# Patient Record
Sex: FEMALE | Race: WHITE | NOT HISPANIC OR LATINO
[De-identification: names, ages, dates, MRNs, and addresses within clinical notes are randomized per-mention and may not be internally consistent; named-entity substitution may affect disease eponyms.]

---

## 2021-04-08 ENCOUNTER — APPOINTMENT (OUTPATIENT)
Dept: INTERNAL MEDICINE | Facility: CLINIC | Age: 66
End: 2021-04-08
Payer: COMMERCIAL

## 2021-04-08 ENCOUNTER — LABORATORY RESULT (OUTPATIENT)
Age: 66
End: 2021-04-08

## 2021-04-08 ENCOUNTER — NON-APPOINTMENT (OUTPATIENT)
Age: 66
End: 2021-04-08

## 2021-04-08 VITALS
SYSTOLIC BLOOD PRESSURE: 149 MMHG | RESPIRATION RATE: 12 BRPM | WEIGHT: 293 LBS | HEIGHT: 62 IN | HEART RATE: 100 BPM | DIASTOLIC BLOOD PRESSURE: 91 MMHG | BODY MASS INDEX: 53.92 KG/M2 | OXYGEN SATURATION: 97 %

## 2021-04-08 DIAGNOSIS — J30.9 ALLERGIC RHINITIS, UNSPECIFIED: ICD-10-CM

## 2021-04-08 DIAGNOSIS — Z78.9 OTHER SPECIFIED HEALTH STATUS: ICD-10-CM

## 2021-04-08 DIAGNOSIS — U07.1 COVID-19: ICD-10-CM

## 2021-04-08 DIAGNOSIS — Z83.3 FAMILY HISTORY OF DIABETES MELLITUS: ICD-10-CM

## 2021-04-08 DIAGNOSIS — Z82.69 FAMILY HISTORY OF OTHER DISEASES OF THE MUSCULOSKELETAL SYSTEM AND CONNECTIVE TISSUE: ICD-10-CM

## 2021-04-08 PROCEDURE — 99205 OFFICE O/P NEW HI 60 MIN: CPT | Mod: 25

## 2021-04-08 PROCEDURE — 99417 PROLNG OP E/M EACH 15 MIN: CPT | Mod: 25

## 2021-04-08 PROCEDURE — 36415 COLL VENOUS BLD VENIPUNCTURE: CPT

## 2021-04-08 PROCEDURE — 99072 ADDL SUPL MATRL&STAF TM PHE: CPT

## 2021-04-09 VITALS — DIASTOLIC BLOOD PRESSURE: 88 MMHG | SYSTOLIC BLOOD PRESSURE: 146 MMHG

## 2021-04-09 PROBLEM — Z78.9 NON-SMOKER: Status: ACTIVE | Noted: 2021-04-09

## 2021-04-09 PROBLEM — Z83.3 FAMILY HISTORY OF DIABETES MELLITUS: Status: ACTIVE | Noted: 2021-04-09

## 2021-04-09 PROBLEM — U07.1 COVID-19: Status: RESOLVED | Noted: 2021-04-09 | Resolved: 2021-04-09

## 2021-04-09 PROBLEM — Z82.69 FAMILY HISTORY OF POLYMYALGIA RHEUMATICA: Status: ACTIVE | Noted: 2021-04-09

## 2021-04-09 RX ORDER — BUDESONIDE AND FORMOTEROL FUMARATE DIHYDRATE 160; 4.5 UG/1; UG/1
AEROSOL RESPIRATORY (INHALATION)
Refills: 0 | Status: ACTIVE | COMMUNITY

## 2021-04-09 RX ORDER — FLUTICASONE PROPIONATE 50 MCG
50 SPRAY, SUSPENSION NASAL
Refills: 0 | Status: ACTIVE | COMMUNITY

## 2021-04-09 NOTE — PHYSICAL EXAM
[No Acute Distress] : no acute distress [Well-Appearing] : well-appearing [Normal Sclera/Conjunctiva] : normal sclera/conjunctiva [No Respiratory Distress] : no respiratory distress  [No Accessory Muscle Use] : no accessory muscle use [Clear to Auscultation] : lungs were clear to auscultation bilaterally [Regular Rhythm] : with a regular rhythm [Normal S1, S2] : normal S1 and S2 [No Murmur] : no murmur heard [No Carotid Bruits] : no carotid bruits [de-identified] : Tachycardic [de-identified] : + edema [de-identified] : + calcinosis, ulceration on fingers

## 2021-04-09 NOTE — HISTORY OF PRESENT ILLNESS
[FreeTextEntry8] : Gabby presents today as new patient.\par \par She has complex medical history which includes scleroderma/CREST, HTN, T2DM, and hx of CAH on chronic prednisone. She was previously under care of Rheumatologist who also served as her PCP but who is no longer working. She has been in between providers since that time. She has the following concerns:\par \par 1. Scleroderma\par New Rheum: \par Considering HCQ. Has current symptomatic disease.\par \par 2. T2DM\par On Tradjenta. Did not tolerate metformin. Last A1c was 7.8% but labs from Dec.\par \par 3. Allergic rhinitis\par Constant despite claritin, flonase, and montelukast.\par \par 4. Abnormal CT chest\par B/l lung bases with mild fibrotic changes. + coronary calcification, adrenal nodules, and hepatic leison.\par

## 2021-04-09 NOTE — REVIEW OF SYSTEMS
[Fever] : no fever [Chills] : no chills [Fatigue] : no fatigue [Night Sweats] : no night sweats [Nasal Discharge] : nasal discharge [Chest Pain] : no chest pain [Palpitations] : no palpitations [Shortness Of Breath] : shortness of breath [Abdominal Pain] : no abdominal pain [Nausea] : no nausea [Constipation] : no constipation [Diarrhea] : diarrhea [Vomiting] : no vomiting [Hematuria] : no hematuria [Joint Pain] : joint pain

## 2021-04-09 NOTE — ASSESSMENT
[FreeTextEntry1] : 1. Scleroderma/CREST\par Re-establishing with Rheum- Dr. Spicer\par Active disease with likely contribution to other symptoms\par \par 2. HTN\par Above goal today\par Recheck next week\par CMP today\par \par 3. HLD\par Did not start statin with prior MD\par Recheck lipids\par \par 4. T2DM\par Recheck A1c\par Could consider switch to GLP1 given desire to lose weight\par \par 5. CAH, hypothyroidism\par Needs Endo referral\par Has not had follow up for CAH, but has been on long term prednisone\par Will need DEXA and re-brayan\par TFTs\par \par 6. Adrenal adenoma\par Endo follow up\par Consider hormonal workup next visit though will be hard to test for subclinical Cushing's while on steroids and ?CAH diagnosis\par \par 7. Abonrmal CT chest, ?mild fibrosis at bilateral bases\par ?scleroderma vs 2ndary to COVID19\par Would also have to consider PJP after getting more detailed hx of steroid dosing and how frequently she had higher doses\par Pulm referral\par \par 8. Coronary calcification\par Dyspnea could be 2/2 to scleroderma\par Cards referral\par \par 9. Liver lesion\par Will need MRI abdomen\par Will likely need sedated MRI\par \par 10. Chronic sinusitis\par Continue current\par Check ANCAs\par \par Additional time spent given complexity of new patient visit, interpreting prior lab results, interpreting and revieiwing prior CT results. Additional time coordinating multiple specialty referrals.

## 2021-04-16 ENCOUNTER — APPOINTMENT (OUTPATIENT)
Dept: INTERNAL MEDICINE | Facility: CLINIC | Age: 66
End: 2021-04-16
Payer: COMMERCIAL

## 2021-04-16 VITALS
HEART RATE: 109 BPM | WEIGHT: 293 LBS | SYSTOLIC BLOOD PRESSURE: 170 MMHG | HEIGHT: 62 IN | RESPIRATION RATE: 12 BRPM | DIASTOLIC BLOOD PRESSURE: 81 MMHG | BODY MASS INDEX: 53.92 KG/M2 | OXYGEN SATURATION: 98 %

## 2021-04-16 LAB
ALBUMIN SERPL ELPH-MCNC: 3.9 G/DL
ALP BLD-CCNC: 105 U/L
ALT SERPL-CCNC: 12 U/L
ANION GAP SERPL CALC-SCNC: 11 MMOL/L
AST SERPL-CCNC: 13 U/L
BASOPHILS # BLD AUTO: 0.11 K/UL
BASOPHILS NFR BLD AUTO: 0.9 %
BILIRUB SERPL-MCNC: 0.5 MG/DL
BUN SERPL-MCNC: 10 MG/DL
CALCIUM SERPL-MCNC: 9.7 MG/DL
CHLORIDE SERPL-SCNC: 103 MMOL/L
CHOLEST SERPL-MCNC: 206 MG/DL
CO2 SERPL-SCNC: 24 MMOL/L
CREAT SERPL-MCNC: 0.76 MG/DL
EOSINOPHIL # BLD AUTO: 0.11 K/UL
EOSINOPHIL NFR BLD AUTO: 0.9 %
ESTIMATED AVERAGE GLUCOSE: 220 MG/DL
GLUCOSE SERPL-MCNC: 187 MG/DL
HBA1C MFR BLD HPLC: 9.3 %
HCT VFR BLD CALC: 43.5 %
HDLC SERPL-MCNC: 43 MG/DL
HGB BLD-MCNC: 12.7 G/DL
IMM GRANULOCYTES NFR BLD AUTO: 1.7 %
LDLC SERPL CALC-MCNC: 129 MG/DL
LYMPHOCYTES # BLD AUTO: 1.44 K/UL
LYMPHOCYTES NFR BLD AUTO: 12.4 %
MAN DIFF?: NORMAL
MCHC RBC-ENTMCNC: 23.2 PG
MCHC RBC-ENTMCNC: 29.2 GM/DL
MCV RBC AUTO: 79.5 FL
MONOCYTES # BLD AUTO: 0.92 K/UL
MONOCYTES NFR BLD AUTO: 7.9 %
MPO AB + PR3 PNL SER: NORMAL
NEUTROPHILS # BLD AUTO: 8.85 K/UL
NEUTROPHILS NFR BLD AUTO: 76.2 %
NONHDLC SERPL-MCNC: 163 MG/DL
PLATELET # BLD AUTO: 450 K/UL
POTASSIUM SERPL-SCNC: 4.6 MMOL/L
PROT SERPL-MCNC: 6.6 G/DL
RBC # BLD: 5.47 M/UL
RBC # FLD: 18.5 %
SODIUM SERPL-SCNC: 139 MMOL/L
TRIGL SERPL-MCNC: 171 MG/DL
TSH SERPL-ACNC: 0.8 UIU/ML
WBC # FLD AUTO: 11.63 K/UL

## 2021-04-16 PROCEDURE — 99215 OFFICE O/P EST HI 40 MIN: CPT | Mod: 25

## 2021-04-16 PROCEDURE — 99072 ADDL SUPL MATRL&STAF TM PHE: CPT

## 2021-04-16 PROCEDURE — 36415 COLL VENOUS BLD VENIPUNCTURE: CPT

## 2021-04-17 VITALS — SYSTOLIC BLOOD PRESSURE: 126 MMHG | DIASTOLIC BLOOD PRESSURE: 78 MMHG

## 2021-04-17 NOTE — REVIEW OF SYSTEMS
[Fever] : no fever [Chills] : no chills [Chest Pain] : no chest pain [Shortness Of Breath] : shortness of breath [Abdominal Pain] : no abdominal pain [Nausea] : no nausea [Constipation] : no constipation [Diarrhea] : diarrhea [Vomiting] : no vomiting [Joint Pain] : joint pain

## 2021-04-17 NOTE — PHYSICAL EXAM
[No Acute Distress] : no acute distress [Well Nourished] : well nourished [Well Developed] : well developed [No Respiratory Distress] : no respiratory distress  [No Accessory Muscle Use] : no accessory muscle use [Clear to Auscultation] : lungs were clear to auscultation bilaterally [Regular Rhythm] : with a regular rhythm [Normal S1, S2] : normal S1 and S2 [Alert and Oriented x3] : oriented to person, place, and time [de-identified] : tachycardic

## 2021-04-17 NOTE — HISTORY OF PRESENT ILLNESS
[de-identified] : Gabby returns for follow up.\par \par In interim, she saw Dr. Spicer again and had labs with elevated inflammatory marker and +TPO abs which goes along with her hypothyroidism diagnosis. During last visit, she was re-referred to Pulm (interested in transitioning care), Endo (for both confirmation of CAH dx, workup of adrenal nodukes, also with T2DM and hypothyroidism), and Cards (coronary calcifications, DALTON). \par \par Labs were reviewed. Notable for microcytosis, A1c above goal, HLD. \par \par With regards to T2DM, she does not tolerate metformin. She was previously Jardiance but taken off. She also had prolonged period of UTI. She was given repaglinide from her last PCP but never took it. She does not want to use injectables.\par \par With regards to liver lesion seen on CT, no abdominal pain or symptoms. She needs sedated MRI.\par \par She also has HLD. She was told to take statin in past but never started on it.\par \par Rheum has started her on prazosin for Raynaud's since she didn't tolerate procardia in past. [FreeTextEntry1] : f/u T2DM, HTN, HLD, liver lesion

## 2021-04-17 NOTE — ASSESSMENT
[FreeTextEntry1] : 1. T2DM\par Increase glimepiride to 3mg qAM and 2mg qPM (from 2mg BID)\par She had been forgetting PM dose so will increase gradually and try to increase adherence\par Will monitor raffi SNIDER (has supplies at home)\par Encouraged Endo follow up\par \par 2. HTN\par Improved on recheck\par Continue ACE-I at current dose\par \par 3. Thyroid disease\par Concern for JACK from Rheum\par Add on T4, T3, TSI, receptor abs\par Seeing Eye doctor\par \par 4. Microcytosis\par Check iron panel\par \par 5.HLD\par Start rosuvastatin 5mg qd\par \par 6. Liver lesion\par Rx for MRI abdomen with sedation\par \par 7. CREST\par Following with rheum\par Planning to start Plaquenil potentially\par \par 8. CAH\par Needs Endo follow up\par Encouraged to make appt\par \par 9. DALTON\par Needs both Card,s Pulm\par Encouraged to make appts\par

## 2021-04-20 DIAGNOSIS — R71.8 OTHER ABNORMALITY OF RED BLOOD CELLS: ICD-10-CM

## 2021-04-20 LAB
APPEARANCE: CLEAR
BACTERIA: ABNORMAL
BILIRUBIN URINE: NEGATIVE
BLOOD URINE: NEGATIVE
COLOR: NORMAL
CREAT SPEC-SCNC: 57 MG/DL
FERRITIN SERPL-MCNC: 30 NG/ML
FOLATE SERPL-MCNC: 7.9 NG/ML
GLUCOSE QUALITATIVE U: NEGATIVE
HYALINE CASTS: 0 /LPF
IRON SATN MFR SERPL: 7 %
IRON SERPL-MCNC: 27 UG/DL
KETONES URINE: NEGATIVE
LEUKOCYTE ESTERASE URINE: ABNORMAL
MICROALBUMIN 24H UR DL<=1MG/L-MCNC: <1.2 MG/DL
MICROALBUMIN/CREAT 24H UR-RTO: NORMAL MG/G
MICROSCOPIC-UA: NORMAL
NITRITE URINE: NEGATIVE
PH URINE: 5.5
PROTEIN URINE: NEGATIVE
RED BLOOD CELLS URINE: 2 /HPF
SPECIFIC GRAVITY URINE: 1.01
SQUAMOUS EPITHELIAL CELLS: 2 /HPF
T3FREE SERPL-MCNC: 3.87 PG/ML
T4 FREE SERPL-MCNC: 1.4 NG/DL
THYROGLOB AB SERPL-ACNC: 1281 IU/ML
THYROPEROXIDASE AB SERPL IA-ACNC: 82 IU/ML
TIBC SERPL-MCNC: 374 UG/DL
TSH RECEPTOR AB: <1.1 IU/L
UIBC SERPL-MCNC: 347 UG/DL
UROBILINOGEN URINE: NORMAL
VIT B12 SERPL-MCNC: 305 PG/ML
WHITE BLOOD CELLS URINE: 8 /HPF

## 2021-04-22 LAB — TSI ACT/NOR SER: <0.1 IU/L

## 2021-05-06 ENCOUNTER — APPOINTMENT (OUTPATIENT)
Dept: INTERNAL MEDICINE | Facility: CLINIC | Age: 66
End: 2021-05-06

## 2021-05-25 ENCOUNTER — NON-APPOINTMENT (OUTPATIENT)
Age: 66
End: 2021-05-25

## 2021-06-08 ENCOUNTER — APPOINTMENT (OUTPATIENT)
Dept: INTERNAL MEDICINE | Facility: CLINIC | Age: 66
End: 2021-06-08
Payer: COMMERCIAL

## 2021-06-08 VITALS
DIASTOLIC BLOOD PRESSURE: 84 MMHG | WEIGHT: 293 LBS | BODY MASS INDEX: 62.19 KG/M2 | SYSTOLIC BLOOD PRESSURE: 132 MMHG | TEMPERATURE: 98.1 F | HEART RATE: 108 BPM | OXYGEN SATURATION: 89 %

## 2021-06-08 DIAGNOSIS — H93.19 TINNITUS, UNSPECIFIED EAR: ICD-10-CM

## 2021-06-08 PROCEDURE — 99072 ADDL SUPL MATRL&STAF TM PHE: CPT

## 2021-06-08 PROCEDURE — 36415 COLL VENOUS BLD VENIPUNCTURE: CPT

## 2021-06-08 PROCEDURE — 99215 OFFICE O/P EST HI 40 MIN: CPT | Mod: 25

## 2021-06-08 NOTE — PHYSICAL EXAM
[No Acute Distress] : no acute distress [Well Nourished] : well nourished [Well Developed] : well developed [Well-Appearing] : well-appearing [Normal Sclera/Conjunctiva] : normal sclera/conjunctiva [Normal Outer Ear/Nose] : the outer ears and nose were normal in appearance [Normal TMs] : both tympanic membranes were normal [No Respiratory Distress] : no respiratory distress  [No Accessory Muscle Use] : no accessory muscle use [Clear to Auscultation] : lungs were clear to auscultation bilaterally [Normal Rate] : normal rate  [Regular Rhythm] : with a regular rhythm [Normal S1, S2] : normal S1 and S2 [No Edema] : there was no peripheral edema

## 2021-06-08 NOTE — ASSESSMENT
[FreeTextEntry1] : 1. Tinnitus\par Audiometry normal\par Refer to ENT\par \par 2. T2DM\par On glimepiride 3mg BID, Tradjenta\par Recheck labs\par Will likely add Farxiga\par Would consider swithcing Tradjenta to GLP1 for better glycemic control and to help with wt loss\par \par 3. Obesity\par Refer to med wt management\par \par 4. HLD\par Due for lipids\par \par 5. Iron def\par Did not start iron\par Recheck labs\par

## 2021-06-08 NOTE — HISTORY OF PRESENT ILLNESS
[FreeTextEntry1] : diabetes, tinnitus, HLD [de-identified] : Gabby returns for follow up.\par \par 1. T2DM\par Average blood glucose 190-200. On Tradjenta and glimepiride 3mg BID. Believes Farxiga will be covered by her insurance. She still wishes to avoid injectables. She has not seen Endo yet.\par \par 2. HLD\par Did not start statin\par \par 3. Tinnitus\par Ringing in ears, not pulsatile. Hearing normal. No other URI/ear symptoms.\par

## 2021-06-15 LAB
25(OH)D3 SERPL-MCNC: 32.1 NG/ML
ALBUMIN SERPL ELPH-MCNC: 3.7 G/DL
ALP BLD-CCNC: 97 U/L
ALT SERPL-CCNC: 12 U/L
ANION GAP SERPL CALC-SCNC: 16 MMOL/L
AST SERPL-CCNC: 14 U/L
BASOPHILS # BLD AUTO: 0.14 K/UL
BASOPHILS NFR BLD AUTO: 1.1 %
BILIRUB SERPL-MCNC: 0.3 MG/DL
BUN SERPL-MCNC: 13 MG/DL
CALCIUM SERPL-MCNC: 9.6 MG/DL
CHLORIDE SERPL-SCNC: 101 MMOL/L
CHOLEST SERPL-MCNC: 194 MG/DL
CO2 SERPL-SCNC: 20 MMOL/L
CREAT SERPL-MCNC: 0.79 MG/DL
EOSINOPHIL # BLD AUTO: 0.11 K/UL
EOSINOPHIL NFR BLD AUTO: 0.8 %
ERYTHROCYTE [SEDIMENTATION RATE] IN BLOOD BY WESTERGREN METHOD: 56 MM/HR
FERRITIN SERPL-MCNC: 43 NG/ML
GLUCOSE SERPL-MCNC: 210 MG/DL
HCT VFR BLD CALC: 44.9 %
HDLC SERPL-MCNC: 41 MG/DL
HGB BLD-MCNC: 13.1 G/DL
IMM GRANULOCYTES NFR BLD AUTO: 2 %
IRON SATN MFR SERPL: 10 %
IRON SERPL-MCNC: 37 UG/DL
LDLC SERPL CALC-MCNC: 120 MG/DL
LYMPHOCYTES # BLD AUTO: 1.59 K/UL
LYMPHOCYTES NFR BLD AUTO: 12.3 %
MAN DIFF?: NORMAL
MCHC RBC-ENTMCNC: 23.9 PG
MCHC RBC-ENTMCNC: 29.2 GM/DL
MCV RBC AUTO: 81.9 FL
MONOCYTES # BLD AUTO: 1.06 K/UL
MONOCYTES NFR BLD AUTO: 8.2 %
NEUTROPHILS # BLD AUTO: 9.8 K/UL
NEUTROPHILS NFR BLD AUTO: 75.6 %
NONHDLC SERPL-MCNC: 152 MG/DL
PLATELET # BLD AUTO: 447 K/UL
POTASSIUM SERPL-SCNC: 4.7 MMOL/L
PROT SERPL-MCNC: 6.8 G/DL
RBC # BLD: 5.48 M/UL
RBC # FLD: 18.7 %
SODIUM SERPL-SCNC: 136 MMOL/L
TIBC SERPL-MCNC: 375 UG/DL
TRIGL SERPL-MCNC: 163 MG/DL
UIBC SERPL-MCNC: 339 UG/DL
WBC # FLD AUTO: 12.96 K/UL

## 2021-07-20 ENCOUNTER — APPOINTMENT (OUTPATIENT)
Dept: INTERNAL MEDICINE | Facility: CLINIC | Age: 66
End: 2021-07-20
Payer: COMMERCIAL

## 2021-07-20 VITALS
TEMPERATURE: 98.2 F | SYSTOLIC BLOOD PRESSURE: 125 MMHG | DIASTOLIC BLOOD PRESSURE: 67 MMHG | HEART RATE: 115 BPM | BODY MASS INDEX: 62.19 KG/M2 | WEIGHT: 293 LBS | OXYGEN SATURATION: 88 %

## 2021-07-20 LAB
BILIRUB UR QL STRIP: NORMAL
GLUCOSE UR-MCNC: ABNORMAL
HCG UR QL: NORMAL EU/DL
HGB UR QL STRIP.AUTO: ABNORMAL
KETONES UR-MCNC: NORMAL
LEUKOCYTE ESTERASE UR QL STRIP: ABNORMAL
NITRITE UR QL STRIP: NORMAL
PH UR STRIP: 5
PROT UR STRIP-MCNC: ABNORMAL
SP GR UR STRIP: 1.02

## 2021-07-20 PROCEDURE — 81003 URINALYSIS AUTO W/O SCOPE: CPT | Mod: QW

## 2021-07-20 PROCEDURE — 36415 COLL VENOUS BLD VENIPUNCTURE: CPT

## 2021-07-20 PROCEDURE — 99072 ADDL SUPL MATRL&STAF TM PHE: CPT

## 2021-07-20 PROCEDURE — 99215 OFFICE O/P EST HI 40 MIN: CPT | Mod: 25

## 2021-07-20 RX ORDER — DAPAGLIFLOZIN 5 MG/1
5 TABLET, FILM COATED ORAL
Qty: 90 | Refills: 0 | Status: DISCONTINUED | COMMUNITY
Start: 2021-06-15 | End: 2021-07-20

## 2021-07-21 RX ORDER — CIPROFLOXACIN HYDROCHLORIDE 250 MG/1
250 TABLET, FILM COATED ORAL
Qty: 6 | Refills: 0 | Status: DISCONTINUED | COMMUNITY
Start: 2021-07-20 | End: 2021-07-21

## 2021-07-21 NOTE — HISTORY OF PRESENT ILLNESS
[FreeTextEntry8] : Gabby presents with concern for UTI.\par \par She developed lower abdominal discomfort, itching, and dysuria several days ago. It has not improved. She stopped Farxiga at start of symptoms. No f/c, n/v. or back/flank pain. She otherwsie feels at baseline. She is also worried about her glycemic control and wants second opinion on her scleroderma plan of care. She had previously been referred to and given recommendations for Endo and Rheum but has not set anything up yet.

## 2021-07-21 NOTE — PHYSICAL EXAM
[No Acute Distress] : no acute distress [Well Nourished] : well nourished [Well Developed] : well developed [Well-Appearing] : well-appearing [No Respiratory Distress] : no respiratory distress  [No Accessory Muscle Use] : no accessory muscle use [Clear to Auscultation] : lungs were clear to auscultation bilaterally [Regular Rhythm] : with a regular rhythm [Normal S1, S2] : normal S1 and S2 [Soft] : abdomen soft [Non Tender] : non-tender [Non-distended] : non-distended [Normal Bowel Sounds] : normal bowel sounds [No CVA Tenderness] : no CVA  tenderness [No Spinal Tenderness] : no spinal tenderness [Alert and Oriented x3] : oriented to person, place, and time [de-identified] : +tachycardic

## 2021-07-21 NOTE — REVIEW OF SYSTEMS
[Dysuria] : dysuria [Frequency] : frequency [Fever] : no fever [Chills] : no chills [Night Sweats] : no night sweats [Abdominal Pain] : no abdominal pain [Nausea] : no nausea [Vomiting] : no vomiting [Incontinence] : no incontinence [Hematuria] : no hematuria

## 2021-07-21 NOTE — ASSESSMENT
[FreeTextEntry1] : 1. UTI, +/- candidiasis \par Stop farxiga. Cannot tolerate SGLT2s (this was retrial)\par POCT UA looks like UTI and itching raising concern for co-existing candidiasis\par Treat with cipro and diflucan\par Send UA, culture\par \par 2. T2DM\par Cannot tolerate metformin, SGLT2s\par Continue Jardiance and glimepiride\par Has been referred to Endo in past but has not gone\par Encouraged to make follow up\par \par 3. RAMYA\par Due for iron studies and CBC\par \par 4. Scleroderma\par Poor symptom control\par Re-referred to second opinions for Rheum \par \par ADDENDUM: AFRAID TO START CIPRO SINCE MOM WAS ALLERGIC. PREFERS TO USE MACROBID. WILL RX MACROBID AS NO SIGNS OF COMPLICATED CYSTIITS/PYELO. WILL CALL IF DEVELOPS F/C, N/V/D, OR NOT IMPROVING.

## 2021-07-28 LAB
ALBUMIN SERPL ELPH-MCNC: 3.7 G/DL
ALP BLD-CCNC: 90 U/L
ALT SERPL-CCNC: 17 U/L
ANION GAP SERPL CALC-SCNC: 12 MMOL/L
APPEARANCE: ABNORMAL
AST SERPL-CCNC: 16 U/L
BACTERIA UR CULT: NORMAL
BACTERIA: ABNORMAL
BASOPHILS # BLD AUTO: 0.12 K/UL
BASOPHILS NFR BLD AUTO: 1 %
BILIRUB SERPL-MCNC: 0.4 MG/DL
BILIRUBIN URINE: NEGATIVE
BLOOD URINE: ABNORMAL
BUN SERPL-MCNC: 12 MG/DL
CALCIUM SERPL-MCNC: 9.2 MG/DL
CHLORIDE SERPL-SCNC: 105 MMOL/L
CHOLEST SERPL-MCNC: 187 MG/DL
CO2 SERPL-SCNC: 22 MMOL/L
COLOR: YELLOW
CREAT SERPL-MCNC: 0.77 MG/DL
EOSINOPHIL # BLD AUTO: 0.14 K/UL
EOSINOPHIL NFR BLD AUTO: 1.2 %
ESTIMATED AVERAGE GLUCOSE: 197 MG/DL
FERRITIN SERPL-MCNC: 25 NG/ML
GLUCOSE QUALITATIVE U: ABNORMAL
GLUCOSE SERPL-MCNC: 200 MG/DL
HBA1C MFR BLD HPLC: 8.5 %
HCT VFR BLD CALC: 45 %
HDLC SERPL-MCNC: 40 MG/DL
HGB BLD-MCNC: 13 G/DL
HYALINE CASTS: 2 /LPF
IMM GRANULOCYTES NFR BLD AUTO: 1.7 %
IRON SATN MFR SERPL: 6 %
IRON SERPL-MCNC: 24 UG/DL
KETONES URINE: NEGATIVE
LDLC SERPL CALC-MCNC: 117 MG/DL
LEUKOCYTE ESTERASE URINE: ABNORMAL
LYMPHOCYTES # BLD AUTO: 1.71 K/UL
LYMPHOCYTES NFR BLD AUTO: 14.2 %
MAN DIFF?: NORMAL
MCHC RBC-ENTMCNC: 23.6 PG
MCHC RBC-ENTMCNC: 28.9 GM/DL
MCV RBC AUTO: 81.8 FL
MICROSCOPIC-UA: NORMAL
MONOCYTES # BLD AUTO: 1.1 K/UL
MONOCYTES NFR BLD AUTO: 9.1 %
NEUTROPHILS # BLD AUTO: 8.79 K/UL
NEUTROPHILS NFR BLD AUTO: 72.8 %
NITRITE URINE: NEGATIVE
NONHDLC SERPL-MCNC: 148 MG/DL
PH URINE: 6
PLATELET # BLD AUTO: 466 K/UL
POTASSIUM SERPL-SCNC: 4.2 MMOL/L
PROT SERPL-MCNC: 6.6 G/DL
PROTEIN URINE: ABNORMAL
RBC # BLD: 5.5 M/UL
RBC # FLD: 18.9 %
RED BLOOD CELLS URINE: 19 /HPF
SODIUM SERPL-SCNC: 139 MMOL/L
SPECIFIC GRAVITY URINE: 1.01
SQUAMOUS EPITHELIAL CELLS: 3 /HPF
TIBC SERPL-MCNC: 400 UG/DL
TRIGL SERPL-MCNC: 151 MG/DL
UIBC SERPL-MCNC: 376 UG/DL
UROBILINOGEN URINE: NORMAL
WBC # FLD AUTO: 12.07 K/UL
WHITE BLOOD CELLS URINE: >720 /HPF

## 2021-08-13 ENCOUNTER — RESULT CHARGE (OUTPATIENT)
Age: 66
End: 2021-08-13

## 2021-08-13 ENCOUNTER — APPOINTMENT (OUTPATIENT)
Dept: INTERNAL MEDICINE | Facility: CLINIC | Age: 66
End: 2021-08-13
Payer: COMMERCIAL

## 2021-08-13 VITALS
SYSTOLIC BLOOD PRESSURE: 138 MMHG | HEART RATE: 90 BPM | DIASTOLIC BLOOD PRESSURE: 83 MMHG | WEIGHT: 293 LBS | OXYGEN SATURATION: 93 % | BODY MASS INDEX: 62.19 KG/M2

## 2021-08-13 DIAGNOSIS — B37.3 CANDIDIASIS OF VULVA AND VAGINA: ICD-10-CM

## 2021-08-13 LAB
BILIRUB UR QL STRIP: NORMAL
GLUCOSE UR-MCNC: NORMAL
HCG UR QL: NORMAL EU/DL
HGB UR QL STRIP.AUTO: ABNORMAL
KETONES UR-MCNC: NORMAL
NITRITE UR QL STRIP: NORMAL
PH UR STRIP: 5.5
PROT UR STRIP-MCNC: NORMAL
SP GR UR STRIP: 1.01

## 2021-08-13 PROCEDURE — 81003 URINALYSIS AUTO W/O SCOPE: CPT | Mod: QW

## 2021-08-13 PROCEDURE — 99213 OFFICE O/P EST LOW 20 MIN: CPT | Mod: 25

## 2021-08-13 RX ORDER — NITROFURANTOIN (MONOHYDRATE/MACROCRYSTALS) 25; 75 MG/1; MG/1
100 CAPSULE ORAL TWICE DAILY
Qty: 10 | Refills: 0 | Status: DISCONTINUED | COMMUNITY
Start: 2021-07-21 | End: 2021-08-13

## 2021-08-17 PROBLEM — B37.3 VAGINAL CANDIDIASIS: Status: ACTIVE | Noted: 2021-07-20

## 2021-08-17 NOTE — REVIEW OF SYSTEMS
[Fever] : no fever [Chills] : no chills [Fatigue] : no fatigue [Night Sweats] : no night sweats [Chest Pain] : no chest pain [Palpitations] : no palpitations [Shortness Of Breath] : no shortness of breath [Abdominal Pain] : no abdominal pain [Nausea] : no nausea [Constipation] : no constipation [Diarrhea] : diarrhea [Vomiting] : no vomiting [Dysuria] : no dysuria [Hematuria] : no hematuria [Headache] : no headache [Dizziness] : no dizziness

## 2021-08-17 NOTE — PHYSICAL EXAM
[No Acute Distress] : no acute distress [Well Nourished] : well nourished [Well-Appearing] : well-appearing [No Respiratory Distress] : no respiratory distress  [No Accessory Muscle Use] : no accessory muscle use [Clear to Auscultation] : lungs were clear to auscultation bilaterally [Regular Rhythm] : with a regular rhythm [Normal S1, S2] : normal S1 and S2 [Soft] : abdomen soft [Non Tender] : non-tender [No CVA Tenderness] : no CVA  tenderness

## 2021-08-17 NOTE — ASSESSMENT
[FreeTextEntry1] : 1. UTI\par POCT UA improved\par Send for UA and culture\par no further treatment at this time

## 2021-08-17 NOTE — HISTORY OF PRESENT ILLNESS
[FreeTextEntry1] : f/u UTI [de-identified] : Gabby returns for follow up.\par \par She completed treatment for UTI and co-treatment for candidiasis. She is feeling improved. No recurrent symptoms. No f/c, abdominal or back pain,hematuria, or dysuria.

## 2021-08-19 LAB
APPEARANCE: ABNORMAL
BACTERIA UR CULT: NORMAL
BACTERIA: NEGATIVE
BILIRUBIN URINE: NEGATIVE
BLOOD URINE: NEGATIVE
COLOR: YELLOW
GLUCOSE QUALITATIVE U: NEGATIVE
HYALINE CASTS: 0 /LPF
KETONES URINE: NEGATIVE
LEUKOCYTE ESTERASE URINE: ABNORMAL
MICROSCOPIC-UA: NORMAL
NITRITE URINE: NEGATIVE
PH URINE: 6
PROTEIN URINE: NORMAL
RED BLOOD CELLS URINE: 3 /HPF
SPECIFIC GRAVITY URINE: 1.01
SQUAMOUS EPITHELIAL CELLS: 8 /HPF
URINE COMMENTS: NORMAL
UROBILINOGEN URINE: NORMAL
WHITE BLOOD CELLS URINE: 165 /HPF

## 2021-09-15 ENCOUNTER — APPOINTMENT (OUTPATIENT)
Dept: ENDOCRINOLOGY | Facility: CLINIC | Age: 66
End: 2021-09-15
Payer: COMMERCIAL

## 2021-09-15 VITALS
DIASTOLIC BLOOD PRESSURE: 89 MMHG | HEIGHT: 62 IN | OXYGEN SATURATION: 99 % | BODY MASS INDEX: 53.92 KG/M2 | SYSTOLIC BLOOD PRESSURE: 138 MMHG | HEART RATE: 89 BPM | WEIGHT: 293 LBS

## 2021-09-15 DIAGNOSIS — D35.00 BENIGN NEOPLASM OF UNSPECIFIED ADRENAL GLAND: ICD-10-CM

## 2021-09-15 PROCEDURE — 99205 OFFICE O/P NEW HI 60 MIN: CPT | Mod: 25

## 2021-09-15 PROCEDURE — 36415 COLL VENOUS BLD VENIPUNCTURE: CPT

## 2021-09-19 PROBLEM — D35.00 ADRENAL ADENOMA: Status: ACTIVE | Noted: 2021-04-09

## 2021-09-19 NOTE — CONSULT LETTER
[Dear  ___] : Dear  [unfilled], [Consult Letter:] : I had the pleasure of evaluating your patient, [unfilled]. [Please see my note below.] : Please see my note below. [Consult Closing:] : Thank you very much for allowing me to participate in the care of this patient.  If you have any questions, please do not hesitate to contact me. [Sincerely,] : Sincerely, [FreeTextEntry3] : Basia Granados MS. DO.\par Endocrinology, Diabetes and Metabolism\par [address] [place] [address]\[place] [address]\par Tel [phone]\par Fax [phone]\par

## 2021-09-19 NOTE — HISTORY OF PRESENT ILLNESS
[FreeTextEntry1] : Ms. ASHLEY SANDOVAL  is a 65 year old female with past medical history of Diabetes Mellitus Type 2, CAH on prednisone, Scleroderma, Hashimotos Hypothyroidism, Adrenal Nodules, Wheelchair bound with cane for short distances with ankle issues who presents for management. \par \par Re Diabetes\par She has had difficulty in controlling her  blood sugars. Patient denies any history of diabetic retinopathy, nephropathy or neuropathy. He denies any blurry vision, polyuria, polydipsia and numbness/tingling in the extremities.\par \par \par POCT Glucose: mg/dL\par POCT Hga1c: %\par \par \par Diabetes first diagnosed:\par Type: 2\par \par Current diabetic regimen:\par Glimepiride 3 mg BID\par Tradjenta 5 mg QD\par Farxiga (yeast infection)\par \par Other relevant medications:\par \par Self monitoring blood glucose : 1x times per day:\par \par SMBG:\par Pre-breakfast:200s\par Pre-lunch:\par Pre-dinner:\par bedtime:\par \par Hypoglycemia:\par \par \par Diet:Tries to maintain\par \par Exercise:\par \par Urine micro: trace prot (8/2021)\par lipid profile: (7/2021)\par last hba1c:8.5% (7/2021)\par eye exam:none\par diabetic foot exam/podiatry:NA\par \par Re CAH\par Patient was diagnosed while being managed by fertility specialists. She was placed on dexamethasone to help with fertility. She had 5 miscarriages. She was able to eventually able to have one child. She was always managed by fertility. She did see an endo 5 years ago but she did not have a formal evaluation for CAH. Her diabetes has been uncontrolled. She has gained a lot of weight. \par \par Re Thyroid\par She was diagnosed with hypothyroidism in her 20s and she has been on thyroid replacement. She takes Levothyroxine 175 mcg QD. \par \par

## 2021-09-19 NOTE — ASSESSMENT
[FreeTextEntry1] : 65 year old female with past medical history of Diabetes Mellitus Type 2, CAH on prednisone, Scleroderma, Hashimotos Hypothyroidism, Adrenal Nodules, Wheelchair bound with cane for short distances with ankle issues who presents for management\par \par 1. DM 2- uncontrolled, uncomplicated\par Patient counseled on the importance of diabetic control and complications. Patient's diet and the necessary changes discussed. Reviewed over freestyle yuliya and use. Reviewed over glycemic goals. Discussed other options for diabetes control. Suggested to try GLP-1. Reviewed mechanism of action, risks and benefits. Patient agrees to try. \par \par Training provided for GLP-1 injection.\par Cont Glimepiride 3 mg BID\par Stop Tradjenta\par Start Trulicity\par Check fsg \par Cont diabetic diet\par Cont exercise\par Blood was drawn in the office today\par will check hga1c\par Opthalmology Visit needs follow up\par Foot Exam up to date\par \par 2. HLD- stable\par Cont Rosuvastatin\par \par 3. Hypothyroidism\par Cont Levothyroxine 175 mcg QD\par \par 4. CAH\par Will check levels\par Cont Prednisone \par May switch to hydrocortisone\par

## 2021-10-04 LAB
17OHP SERPL-MCNC: 15 NG/DL
CHOLEST SERPL-MCNC: 203 MG/DL
DHEA-S SERPL-MCNC: 65.5 UG/DL
ESTIMATED AVERAGE GLUCOSE: 209 MG/DL
HBA1C MFR BLD HPLC: 8.9 %
HDLC SERPL-MCNC: 44 MG/DL
LDLC SERPL CALC-MCNC: 133 MG/DL
NONHDLC SERPL-MCNC: 159 MG/DL
TESTOST BND SERPL-MCNC: 5.4 PG/ML
TESTOSTERONE TOTAL S: 34 NG/DL
TRIGL SERPL-MCNC: 132 MG/DL

## 2021-10-18 ENCOUNTER — NON-APPOINTMENT (OUTPATIENT)
Age: 66
End: 2021-10-18

## 2021-11-30 ENCOUNTER — APPOINTMENT (OUTPATIENT)
Dept: ENDOCRINOLOGY | Facility: CLINIC | Age: 66
End: 2021-11-30
Payer: COMMERCIAL

## 2021-11-30 VITALS — OXYGEN SATURATION: 95 % | SYSTOLIC BLOOD PRESSURE: 137 MMHG | HEART RATE: 74 BPM | DIASTOLIC BLOOD PRESSURE: 84 MMHG

## 2021-11-30 PROCEDURE — 99215 OFFICE O/P EST HI 40 MIN: CPT | Mod: 25

## 2021-11-30 PROCEDURE — 82962 GLUCOSE BLOOD TEST: CPT

## 2021-11-30 PROCEDURE — 83036 HEMOGLOBIN GLYCOSYLATED A1C: CPT | Mod: QW

## 2021-11-30 NOTE — HISTORY OF PRESENT ILLNESS
[FreeTextEntry1] : Ms. ASHLEY SANDOVAL  is a 65 year old female with past medical history of Diabetes Mellitus Type 2, CAH on prednisone, Scleroderma, Hashimoto's Hypothyroidism, Adrenal Nodules, Wheelchair bound with cane for short distances with ankle issues who presents for management. \par \par Re Diabetes\par She has had difficulty in controlling her  blood sugars. Patient denies any history of diabetic retinopathy, nephropathy or neuropathy. He denies any blurry vision, polyuria, polydipsia and numbness/tingling in the extremities.\par \par \par POCT Glucose: 226 mg/dL\par POCT Hga1c: %\par \par \par Diabetes first diagnosed:\par Type: 2\par \par Current diabetic regimen:\par Glimepiride 3 mg BID\par Tradjenta 5 mg QD (stopped)\par Trulicity 0.75 mg Qweekly\par Farxiga (yeast infection)\par \par Other relevant medications:\par \par Self monitoring blood glucose : 1x times per day:\par \par SMBG:\par Pre-breakfast:200s\par Pre-lunch:\par Pre-dinner:\par bedtime:\par \par Hypoglycemia:\par \par \par Diet:Tries to maintain\par \par Exercise:\par \par Urine micro: trace prot (8/2021)\par lipid profile: (7/2021)\par last hba1c:7.9% (11/2021), 8.5% (7/2021)\par eye exam:none\par diabetic foot exam/podiatry:in office 9/2021\par \par Re CAH\par Patient was diagnosed while being managed by fertility specialists. She was placed on dexamethasone to help with fertility. She had 5 miscarriages. She was able to eventually able to have one child. She was always managed by fertility. She did see an endo 5 years ago but she did not have a formal evaluation for CAH. Her diabetes has been uncontrolled. She has gained a lot of weight. Attempt was made to switch from prednisone to hydrocortisone. Patient developed sob.\par \par Re Thyroid\par She was diagnosed with hypothyroidism in her 20s and she has been on thyroid replacement. She takes Levothyroxine 175 mcg QD. \par \par

## 2021-12-13 ENCOUNTER — RX RENEWAL (OUTPATIENT)
Age: 66
End: 2021-12-13

## 2021-12-13 ENCOUNTER — APPOINTMENT (OUTPATIENT)
Dept: PULMONOLOGY | Facility: CLINIC | Age: 66
End: 2021-12-13
Payer: COMMERCIAL

## 2021-12-13 VITALS
HEART RATE: 101 BPM | BODY MASS INDEX: 53.92 KG/M2 | WEIGHT: 293 LBS | OXYGEN SATURATION: 96 % | HEIGHT: 62 IN | SYSTOLIC BLOOD PRESSURE: 148 MMHG | DIASTOLIC BLOOD PRESSURE: 83 MMHG

## 2021-12-13 DIAGNOSIS — Z20.822 ENCOUNTER FOR PREPROCEDURAL LABORATORY EXAMINATION: ICD-10-CM

## 2021-12-13 DIAGNOSIS — Z01.812 ENCOUNTER FOR PREPROCEDURAL LABORATORY EXAMINATION: ICD-10-CM

## 2021-12-13 PROCEDURE — 99204 OFFICE O/P NEW MOD 45 MIN: CPT

## 2021-12-13 RX ORDER — FLUTICASONE FUROATE AND VILANTEROL TRIFENATATE 100; 25 UG/1; UG/1
100-25 POWDER RESPIRATORY (INHALATION)
Qty: 1 | Refills: 2 | Status: ACTIVE | COMMUNITY
Start: 2021-12-13 | End: 1900-01-01

## 2021-12-13 NOTE — ASSESSMENT
[FreeTextEntry1] : zpack/flonase, fu with ENT\par \par repeat ct chest march 2022\par \par fu for PFT and exercise oximetry\par \par trial of breo once daily in the meantime\par \par weight also a factor which she is aware of.

## 2021-12-13 NOTE — HISTORY OF PRESENT ILLNESS
[Former] : former [TextBox_4] : 66F with scleroderma, hashimotos, dm, obesity, congenital adrenal hyperplasia on prednisone\par \par covid infection feb 2020 (not documented)\par covid again nov 2021 (not documented but son was + and lives together)\par \par chronic cough, post nasal drip--on flonase, hasn't seen ent\par worsening dyspnea since feb 2020\par ct chest march 2021 with some ILD\par  [TextBox_13] : 4 [YearQuit] : 1981

## 2021-12-13 NOTE — CONSULT LETTER
[DrCarlene  ___] : Dr. MAHARAJ [FreeTextEntry1] : Dear ,\par \par I had the pleasure of evaluating your patient, ASHLEY SANDOVAL today in pulmonary consultation.  Please refer to my attached note for my findings and recommendations.\par \par \par Thank you for allowing me to participate in the care of your patient, please feel free to call with any questions or concerns.\par \par \par Sincerely,\par \par Vanesa Waldron MD\par [facility] \par [facility]\par \par

## 2022-01-03 ENCOUNTER — RX RENEWAL (OUTPATIENT)
Age: 67
End: 2022-01-03

## 2022-01-06 ENCOUNTER — APPOINTMENT (OUTPATIENT)
Dept: PULMONOLOGY | Facility: CLINIC | Age: 67
End: 2022-01-06

## 2022-01-20 ENCOUNTER — RX RENEWAL (OUTPATIENT)
Age: 67
End: 2022-01-20

## 2022-02-08 ENCOUNTER — APPOINTMENT (OUTPATIENT)
Dept: INTERNAL MEDICINE | Facility: CLINIC | Age: 67
End: 2022-02-08
Payer: COMMERCIAL

## 2022-02-08 VITALS
SYSTOLIC BLOOD PRESSURE: 179 MMHG | WEIGHT: 293 LBS | BODY MASS INDEX: 51.91 KG/M2 | OXYGEN SATURATION: 97 % | HEIGHT: 63 IN | HEART RATE: 107 BPM | DIASTOLIC BLOOD PRESSURE: 111 MMHG | RESPIRATION RATE: 12 BRPM

## 2022-02-08 VITALS — DIASTOLIC BLOOD PRESSURE: 90 MMHG | SYSTOLIC BLOOD PRESSURE: 162 MMHG

## 2022-02-08 DIAGNOSIS — J32.9 CHRONIC SINUSITIS, UNSPECIFIED: ICD-10-CM

## 2022-02-08 PROCEDURE — 36415 COLL VENOUS BLD VENIPUNCTURE: CPT

## 2022-02-08 PROCEDURE — 99214 OFFICE O/P EST MOD 30 MIN: CPT | Mod: 25

## 2022-02-08 RX ORDER — LINAGLIPTIN 5 MG/1
5 TABLET, FILM COATED ORAL DAILY
Qty: 90 | Refills: 0 | Status: DISCONTINUED | COMMUNITY
End: 2022-02-08

## 2022-02-08 RX ORDER — FLUCONAZOLE 150 MG/1
150 TABLET ORAL
Qty: 1 | Refills: 0 | Status: DISCONTINUED | COMMUNITY
Start: 2021-07-20 | End: 2022-02-08

## 2022-02-08 RX ORDER — NITROFURANTOIN (MONOHYDRATE/MACROCRYSTALS) 25; 75 MG/1; MG/1
100 CAPSULE ORAL TWICE DAILY
Qty: 10 | Refills: 0 | Status: DISCONTINUED | COMMUNITY
Start: 2021-08-17 | End: 2022-02-08

## 2022-02-08 RX ORDER — AZITHROMYCIN 250 MG/1
250 TABLET, FILM COATED ORAL
Qty: 1 | Refills: 0 | Status: DISCONTINUED | COMMUNITY
Start: 2021-12-13 | End: 2022-02-08

## 2022-02-08 RX ORDER — HYDROCORTISONE 20 MG/1
20 TABLET ORAL
Qty: 180 | Refills: 2 | Status: DISCONTINUED | COMMUNITY
Start: 2021-10-01 | End: 2022-02-08

## 2022-02-08 RX ORDER — PREDNISONE 5 MG/1
5 TABLET ORAL
Refills: 0 | Status: ACTIVE | COMMUNITY
Start: 2022-02-08

## 2022-02-08 NOTE — ASSESSMENT
[FreeTextEntry1] : 1. HTN\par Above goal\par Continue current regimen for now as previously well controlled\par Due for labs\par Return in 1 week for BP check\par \par 2. HLD\par Due for lipids but stopped her statin \par Will resume and then check at next labs\par \par 3. T2DM\par Due for A1c\par \par 4. Hypothryoidism\par TFTs\par \par 5. ILD\par Advised importance of pulm follow up\par

## 2022-02-08 NOTE — REVIEW OF SYSTEMS
[Fever] : no fever [Chills] : no chills [Fatigue] : fatigue [Night Sweats] : no night sweats [Chest Pain] : no chest pain [Shortness Of Breath] : shortness of breath [Abdominal Pain] : no abdominal pain [Joint Pain] : joint pain

## 2022-02-08 NOTE — HISTORY OF PRESENT ILLNESS
[FreeTextEntry1] : f/u T2DM, ILD, hypothyroidism  [de-identified] : Saundra presents for follow up. She was seeing Endo for T2DM and was started on Trulicity. She is down about 15 pounds from when I first met her. She also was following with Pulm. She was scheduled to go for PFTs and repeat Pulm follow up but cancelled as she couldn’t get a COVID19 test. We discussed the importance of continued follow up. She continues to struggle with her SOB.

## 2022-02-10 ENCOUNTER — RX RENEWAL (OUTPATIENT)
Age: 67
End: 2022-02-10

## 2022-02-14 LAB
25(OH)D3 SERPL-MCNC: 32.8 NG/ML
ALBUMIN SERPL ELPH-MCNC: 3.8 G/DL
ALP BLD-CCNC: 102 U/L
ALT SERPL-CCNC: 12 U/L
ANION GAP SERPL CALC-SCNC: 13 MMOL/L
AST SERPL-CCNC: 16 U/L
BASOPHILS # BLD AUTO: 0.14 K/UL
BASOPHILS NFR BLD AUTO: 1.3 %
BILIRUB SERPL-MCNC: 0.3 MG/DL
BUN SERPL-MCNC: 11 MG/DL
CALCIUM SERPL-MCNC: 9.9 MG/DL
CHLORIDE SERPL-SCNC: 100 MMOL/L
CO2 SERPL-SCNC: 23 MMOL/L
CREAT SERPL-MCNC: 0.8 MG/DL
EOSINOPHIL # BLD AUTO: 0.14 K/UL
EOSINOPHIL NFR BLD AUTO: 1.3 %
ESTIMATED AVERAGE GLUCOSE: 183 MG/DL
FERRITIN SERPL-MCNC: 58 NG/ML
FOLATE SERPL-MCNC: 4.6 NG/ML
GLUCOSE SERPL-MCNC: 164 MG/DL
HBA1C MFR BLD HPLC: 8 %
HCT VFR BLD CALC: 48.4 %
HGB BLD-MCNC: 14.5 G/DL
IMM GRANULOCYTES NFR BLD AUTO: 0.9 %
IRON SATN MFR SERPL: 17 %
IRON SERPL-MCNC: 66 UG/DL
LYMPHOCYTES # BLD AUTO: 1.49 K/UL
LYMPHOCYTES NFR BLD AUTO: 14.1 %
MAN DIFF?: NORMAL
MCHC RBC-ENTMCNC: 25 PG
MCHC RBC-ENTMCNC: 30 GM/DL
MCV RBC AUTO: 83.3 FL
MONOCYTES # BLD AUTO: 0.95 K/UL
MONOCYTES NFR BLD AUTO: 9 %
NEUTROPHILS # BLD AUTO: 7.73 K/UL
NEUTROPHILS NFR BLD AUTO: 73.4 %
PLATELET # BLD AUTO: 419 K/UL
POTASSIUM SERPL-SCNC: 4.6 MMOL/L
PROT SERPL-MCNC: 6.8 G/DL
RBC # BLD: 5.81 M/UL
RBC # FLD: 17.2 %
SODIUM SERPL-SCNC: 136 MMOL/L
T4 FREE SERPL-MCNC: 1.5 NG/DL
TIBC SERPL-MCNC: 383 UG/DL
TSH SERPL-ACNC: 0.76 UIU/ML
UIBC SERPL-MCNC: 318 UG/DL
VIT B12 SERPL-MCNC: 297 PG/ML
WBC # FLD AUTO: 10.54 K/UL

## 2022-03-01 ENCOUNTER — APPOINTMENT (OUTPATIENT)
Dept: ENDOCRINOLOGY | Facility: CLINIC | Age: 67
End: 2022-03-01
Payer: COMMERCIAL

## 2022-03-01 VITALS
HEART RATE: 103 BPM | OXYGEN SATURATION: 99 % | HEIGHT: 63 IN | WEIGHT: 293 LBS | SYSTOLIC BLOOD PRESSURE: 134 MMHG | BODY MASS INDEX: 51.91 KG/M2 | DIASTOLIC BLOOD PRESSURE: 78 MMHG

## 2022-03-01 PROCEDURE — 99214 OFFICE O/P EST MOD 30 MIN: CPT

## 2022-03-01 PROCEDURE — 82962 GLUCOSE BLOOD TEST: CPT

## 2022-03-01 NOTE — HISTORY OF PRESENT ILLNESS
[FreeTextEntry1] : Ms. ASHLEY SANDOVAL  is a 66 year old female with past medical history of Diabetes Mellitus Type 2, CAH on prednisone, Scleroderma, Hashimoto's Hypothyroidism, Adrenal Nodules, Wheelchair bound with cane for short distances with ankle issues who presents for management. \par \par Re Diabetes\par She has had difficulty in controlling her  blood sugars. Patient denies any history of diabetic retinopathy, nephropathy or neuropathy. He denies any blurry vision, polyuria, polydipsia and numbness/tingling in the extremities.\par \par \par POCT Glucose: 246 mg/dL\par POCT Hga1c: %\par \par \par Diabetes first diagnosed:\par Type: 2\par \par Current diabetic regimen:\par Glimepiride 3 mg BID\par Tradjenta 5 mg QD (stopped)\par Trulicity 1.5 mg Qweekly\par Farxiga (yeast infection)\par \par Other relevant medications:\par \par Self monitoring blood glucose : 1x times per day:\par \par SMBG:\par Pre-breakfast:200s\par Pre-lunch:\par Pre-dinner:\par bedtime:\par \par Hypoglycemia:\par \par \par Diet:Tries to maintain\par \par Exercise:\par \par Urine micro: trace prot (8/2021)\par lipid profile: (10/2021),  (7/2021)\par last hba1c:8% (2/2022), 7.9% (11/2021), 8.5% (7/2021)\par eye exam:none\par diabetic foot exam/podiatry:in office 9/2021\par \par Re CAH\par Patient was diagnosed while being managed by fertility specialists. She was placed on dexamethasone to help with fertility. She had 5 miscarriages. She was able to eventually able to have one child. She was always managed by fertility. She did see an endo 5 years ago but she did not have a formal evaluation for CAH. Her diabetes has been uncontrolled. She has gained a lot of weight. Attempt was made to switch from prednisone to hydrocortisone. Patient developed sob.\par \par Re Thyroid\par She was diagnosed with hypothyroidism in her 20s and she has been on thyroid replacement. She takes Levothyroxine 175 mcg QD. \par \par

## 2022-03-01 NOTE — ASSESSMENT
[FreeTextEntry1] : 65 year old female with past medical history of Diabetes Mellitus Type 2, CAH on prednisone, Scleroderma, Hashimotos Hypothyroidism, Adrenal Nodules, Wheelchair bound with cane for short distances with ankle issues who presents for management\par \par 1. DM 2- uncontrolled, uncomplicated\par Patient counseled on the importance of diabetic control and complications. Patient's diet and the necessary changes discussed. Reviewed over glycemic goals. \par \par Cont Glimepiride 3 mg BID\par Inc to Trulicity 3 mg qweekly\par Check fsg \par Cont diabetic diet\par Cont exercise\par \par Opthalmology Visit needs follow up\par Foot Exam up to date\par \par 2. HLD- stable\par Cont Rosuvastatin\par \par 3. Hypothyroidism\par Cont Levothyroxine 175 mcg QD\par \par 4. CAH\par Unclear if patient really has CAH. But cannot dx until she is switched to lower doses of hydrocortisone\par Cont Prednisone \par Patient also likely needs the prednisone for her rheumatological problems\par She will establish with rheumatology for other treatment modalities\par

## 2022-03-01 NOTE — PHYSICAL EXAM
[Alert] : alert [Well Nourished] : well nourished [No Acute Distress] : no acute distress [Normal Voice/Communication] : normal voice communication [No Rash] : no rash [Oriented x3] : oriented to person, place, and time [de-identified] : wheelchair bound

## 2022-05-31 ENCOUNTER — LABORATORY RESULT (OUTPATIENT)
Age: 67
End: 2022-05-31

## 2022-05-31 ENCOUNTER — APPOINTMENT (OUTPATIENT)
Dept: RHEUMATOLOGY | Facility: CLINIC | Age: 67
End: 2022-05-31
Payer: COMMERCIAL

## 2022-05-31 VITALS
OXYGEN SATURATION: 95 % | WEIGHT: 293 LBS | SYSTOLIC BLOOD PRESSURE: 107 MMHG | DIASTOLIC BLOOD PRESSURE: 62 MMHG | HEART RATE: 98 BPM | BODY MASS INDEX: 51.91 KG/M2 | HEIGHT: 63 IN

## 2022-05-31 PROCEDURE — 36415 COLL VENOUS BLD VENIPUNCTURE: CPT | Mod: GC

## 2022-05-31 PROCEDURE — 99205 OFFICE O/P NEW HI 60 MIN: CPT | Mod: 25

## 2022-06-01 LAB
CENTROMERE IGG SER-ACNC: <0.2 CD:130001892
CHROMATIN AB SERPL-ACNC: 0.2 AL
CRP SERPL-MCNC: 26 MG/L
DSDNA AB SER-ACNC: 52 IU/ML
ENA RNP AB SER IA-ACNC: <0.2 AL
ENA SCL70 IGG SER IA-ACNC: <0.2 AL
ENA SM AB SER IA-ACNC: <0.2 AL
ENA SS-A AB SER IA-ACNC: <0.2 AL
ENA SS-B AB SER IA-ACNC: <0.2 AL
ERYTHROCYTE [SEDIMENTATION RATE] IN BLOOD BY WESTERGREN METHOD: 59 MM/HR
RHEUMATOID FACT SER QL: <10 IU/ML
RIBOSOMAL P AB SER IA-ACNC: <0.2 AL
THYROGLOB AB SERPL-ACNC: 2640 IU/ML
THYROPEROXIDASE AB SERPL IA-ACNC: 94.6 IU/ML

## 2022-06-01 NOTE — REASON FOR VISIT
[Initial Eval - Existing Diagnosis] : an initial evaluation of an existing diagnosis [FreeTextEntry1] : scleroderma, Raynauds, ILD

## 2022-06-01 NOTE — PHYSICAL EXAM
[General Appearance - Alert] : alert [General Appearance - In No Acute Distress] : in no acute distress [Sclera] : the sclera and conjunctiva were normal [Extraocular Movements] : extraocular movements were intact [Examination Of The Oral Cavity] : the lips and gums were normal [Oropharynx] : the oropharynx was normal [] : no respiratory distress [Respiration, Rhythm And Depth] : normal respiratory rhythm and effort [Exaggerated Use Of Accessory Muscles For Inspiration] : no accessory muscle use [Auscultation Breath Sounds / Voice Sounds] : lungs were clear to auscultation bilaterally [Heart Rate And Rhythm] : heart rate was normal and rhythm regular [Heart Sounds] : normal S1 and S2 [Heart Sounds Gallop] : no gallops [Murmurs] : no murmurs [Heart Sounds Pericardial Friction Rub] : no pericardial rub [Edema] : there was no peripheral edema [No Spinal Tenderness] : no spinal tenderness [No Focal Deficits] : no focal deficits [Oriented To Time, Place, And Person] : oriented to person, place, and time [Affect] : the affect was normal [Mood] : the mood was normal [FreeTextEntry1] : +facial telangiectasias seen; +Raynauds discoloration noted in fingertips in b/l hands; +mild skin tightening noted in all fingertips, but gets looser in proximal hands and forearms

## 2022-06-01 NOTE — REVIEW OF SYSTEMS
[SOB on Exertion] : shortness of breath during exertion [Joint Pain] : joint pain [As Noted in HPI] : as noted in HPI [Difficulty Walking] : difficulty walking [Negative] : Heme/Lymph [Fever] : no fever [Chills] : no chills [Nasal Discharge] : no nasal discharge [Sore Throat] : no sore throat [Chest Pain] : no chest pain [Palpitations] : no palpitations [Lower Ext Edema] : no lower extremity edema [FreeTextEntry9] : b/l ankle pain, lower back pain, nodules in PIP/DIP joints occasionally draining fluid [de-identified] : telangiectasias on face, small raised bumps on arms/legs (not evaluated yet), nodules/cysts in PIP/DIP joints occasionally draining fluid as above

## 2022-06-01 NOTE — DATA REVIEWED
[FreeTextEntry1] : CBC and CMP from 2/2022 reviewed\par CMP grossly normal, and Hgb within normal limits at that time.

## 2022-06-01 NOTE — HISTORY OF PRESENT ILLNESS
[FreeTextEntry1] : 66F with scleroderma (diagnosed 2001- sx of skin tightening in hands and around mouth, Raynauds, telangiectasias, reported calcinosis, dysphagia, and recent onset ILD), Hashimoto's thyroiditis, HTN, HLD, DM2, congenital adrenal hyperplasia (reported and in chart, on chronic steroids, currently on prednisone 5 mg QD), wheelchair bound recently due to dyspnea + b/l ankles deformity, here to establish care for scleroderma treatment.\par \par Previously was seeing Dr. Spicer- as mentioned above was on prednisone 5 mg QD (though more for adrenal issues than for scleroderma) and diclofenac 75 mg BID for years (though takes it once daily at this time). Other doctors (endocrine) have tried to taper this steroid dosage (pt recalls being switched to hydrocortisone recently, however was unable to wean steroids due to her breathing getting worse with lower dose of steroids). \par Pt had a CT chest 3/2021 showing mild ILD in lung bases- has not repeated since then. Has not yet gotten PFT's. \par \par Patient notes difficulty swallowing for many years though has never had a GI evaluation.\par Notes having COVID-19 on two occasions, once in 2/2020 (onset of pandemic although not test confirmed), was very ill at the time, very short of breath and had high fever for 2 weeks. Notes significant decline in her breathing function since then. She used to walk up 20 steps with no difficulties prior to COVID, however now she needs wheelchair whenever she is not walking very short distances. \par \par Patient has had Raynauds for more than a decade, with reports of digital ulcerations in past. Per chart, had been on procardia and prazosin in past for Raynauds, however reports not taking any medication for Raynauds at this time.\par \par Regarding skin tightening, patient notes that her skin laxity is finally improving now. Skin tightening is limited to distal hands (fingers) but is looser in forearms and in face now.

## 2022-06-02 LAB — CH50 SERPL-MCNC: 81 U/ML

## 2022-06-03 LAB
ANA PAT FLD IF-IMP: ABNORMAL
ANA SER IF-ACNC: ABNORMAL
RNA POLYMERASE III IGG: 107 UNITS

## 2022-06-04 LAB
ALBUMIN MFR SERPL ELPH: 52 %
ALBUMIN SERPL-MCNC: 3.8 G/DL
ALBUMIN/GLOB SERPL: 1.1 RATIO
ALPHA1 GLOB MFR SERPL ELPH: 5.4 %
ALPHA1 GLOB SERPL ELPH-MCNC: 0.4 G/DL
ALPHA2 GLOB MFR SERPL ELPH: 13.9 %
ALPHA2 GLOB SERPL ELPH-MCNC: 1 G/DL
B-GLOBULIN MFR SERPL ELPH: 13.5 %
B-GLOBULIN SERPL ELPH-MCNC: 1 G/DL
C3 SERPL-MCNC: 167 MG/DL
C4 SERPL-MCNC: 24 MG/DL
DEPRECATED KAPPA LC FREE/LAMBDA SER: 1.29 RATIO
GAMMA GLOB FLD ELPH-MCNC: 1.1 G/DL
GAMMA GLOB MFR SERPL ELPH: 15.2 %
HISTONE AB SER QL: 0.4 UNITS
IGA SER QL IEP: 328 MG/DL
IGG SER QL IEP: 1028 MG/DL
IGM SER QL IEP: 57 MG/DL
INTERPRETATION SERPL IEP-IMP: NORMAL
KAPPA LC CSF-MCNC: 2 MG/DL
KAPPA LC SERPL-MCNC: 2.58 MG/DL
M PROTEIN SPEC IFE-MCNC: NORMAL
PROT SERPL-MCNC: 7.3 G/DL
PROT SERPL-MCNC: 7.3 G/DL

## 2022-06-07 ENCOUNTER — RX RENEWAL (OUTPATIENT)
Age: 67
End: 2022-06-07

## 2022-06-15 ENCOUNTER — APPOINTMENT (OUTPATIENT)
Dept: ENDOCRINOLOGY | Facility: CLINIC | Age: 67
End: 2022-06-15
Payer: COMMERCIAL

## 2022-06-15 VITALS
OXYGEN SATURATION: 98 % | HEART RATE: 105 BPM | DIASTOLIC BLOOD PRESSURE: 85 MMHG | HEIGHT: 63 IN | BODY MASS INDEX: 51.91 KG/M2 | SYSTOLIC BLOOD PRESSURE: 135 MMHG | WEIGHT: 293 LBS

## 2022-06-15 PROCEDURE — 99214 OFFICE O/P EST MOD 30 MIN: CPT | Mod: 25

## 2022-06-15 PROCEDURE — 82962 GLUCOSE BLOOD TEST: CPT

## 2022-06-15 PROCEDURE — 83036 HEMOGLOBIN GLYCOSYLATED A1C: CPT | Mod: QW

## 2022-06-16 NOTE — ASSESSMENT
[FreeTextEntry1] : 66 year old female with past medical history of Diabetes Mellitus Type 2, CAH on prednisone, Scleroderma, Hashimotos Hypothyroidism, Adrenal Nodules, Wheelchair bound with cane for short distances with ankle issues who presents for management\par \par 1. DM 2- uncontrolled, uncomplicated\par Patient counseled on the importance of diabetic control and complications. Patient's diet and the necessary changes discussed. Reviewed over glycemic goals. Blood sugars improving\par \par Cont Glimepiride 3 mg BID\par Cont Trulicity 3 mg qweekly\par Check fsg \par Cont diabetic diet\par Cont exercise\par Labs at follow up\par Opthalmology Visit needs follow up\par Foot Exam up to date\par \par 2. HLD- stable\par Cont Rosuvastatin\par \par 3. Hypothyroidism\par Cont Levothyroxine 175 mcg QD\par \par 4. CAH\par Unclear if patient really has CAH. But cannot dx until she is switched to lower doses of hydrocortisone\par Cont Prednisone \par Patient also likely needs the prednisone for her rheumatological problems\par She will establish with rheumatology for other treatment modalities\par

## 2022-06-16 NOTE — PHYSICAL EXAM
[Alert] : alert [Well Nourished] : well nourished [No Acute Distress] : no acute distress [Normal Voice/Communication] : normal voice communication [No Rash] : no rash [Oriented x3] : oriented to person, place, and time [de-identified] : wheelchair bound

## 2022-06-16 NOTE — HISTORY OF PRESENT ILLNESS
[FreeTextEntry1] : Ms. ASHLEY SANDOVAL  is a 66 year old female with past medical history of Diabetes Mellitus Type 2, CAH on prednisone, Scleroderma, Hashimoto's Hypothyroidism, Adrenal Nodules, Wheelchair bound with cane for short distances with ankle issues who presents for management. \par \par Re Diabetes\par She has had difficulty in controlling her  blood sugars. Patient denies any history of diabetic retinopathy, nephropathy or neuropathy. He denies any blurry vision, polyuria, polydipsia and numbness/tingling in the extremities.\par \par \par POCT Glucose: 142 mg/dL\par POCT Hga1c: 7.2 %\par \par \par Diabetes first diagnosed:\par Type: 2\par \par Current diabetic regimen:\par Glimepiride 3 mg BID\par Tradjenta 5 mg QD (stopped)\par Trulicity 3 mg Qweekly\par Farxiga (yeast infection)\par \par Other relevant medications:\par \par Self monitoring blood glucose : 1x times per day:\par \par SMBG:\par Pre-breakfast:200s\par Pre-lunch:\par Pre-dinner:\par bedtime:\par \par Hypoglycemia:none\par \par Diet:Tries to maintain\par \par Exercise:Unable due to limites mobility\par \par Urine micro: trace prot (8/2021)\par lipid profile: (10/2021),  (7/2021)\par last hba1c:7.2% (6/2022), 8% (2/2022), 7.9% (11/2021), 8.5% (7/2021)\par eye exam:none\par diabetic foot exam/podiatry:in office 9/2021\par \par Re CAH\par Patient was diagnosed while being managed by fertility specialists. She was placed on dexamethasone to help with fertility. She had 5 miscarriages. She was able to eventually able to have one child. She was always managed by fertility. She did see an endo 5 years ago but she did not have a formal evaluation for CAH. Her diabetes has been uncontrolled. She has gained a lot of weight. Attempt was made to switch from prednisone to hydrocortisone. Patient developed sob.\par \par Re Thyroid\par She was diagnosed with hypothyroidism in her 20s and she has been on thyroid replacement. She takes Levothyroxine 175 mcg QD. \par \par

## 2022-06-17 LAB
EJ AB SER QL: NEGATIVE
ENA JO1 AB SER IA-ACNC: <20 UNITS
ENA PM/SCL AB SER-ACNC: <20 UNITS
ENA SM+RNP AB SER IA-ACNC: <20 UNITS
ENA SS-A IGG SER QL: <20 UNITS
FIBRILLARIN AB SER QL: NEGATIVE
KU AB SER QL: NEGATIVE
MDA-5 (P140)(CADM-140): <20 UNITS
MI2 AB SER QL: NEGATIVE
NXP-2 (P140): <20 UNITS
OJ AB SER QL: NEGATIVE
PL12 AB SER QL: NEGATIVE
PL7 AB SER QL: NEGATIVE
SRP AB SERPL QL: NEGATIVE
TIF GAMMA (P155/140): <20 UNITS
U2 SNRNP AB SER QL: NEGATIVE

## 2022-06-27 ENCOUNTER — RX RENEWAL (OUTPATIENT)
Age: 67
End: 2022-06-27

## 2022-06-30 ENCOUNTER — NON-APPOINTMENT (OUTPATIENT)
Age: 67
End: 2022-06-30

## 2022-07-20 ENCOUNTER — APPOINTMENT (OUTPATIENT)
Dept: RHEUMATOLOGY | Facility: CLINIC | Age: 67
End: 2022-07-20

## 2022-07-25 ENCOUNTER — RX RENEWAL (OUTPATIENT)
Age: 67
End: 2022-07-25

## 2022-09-23 ENCOUNTER — APPOINTMENT (OUTPATIENT)
Dept: ENDOCRINOLOGY | Facility: CLINIC | Age: 67
End: 2022-09-23

## 2022-09-26 ENCOUNTER — APPOINTMENT (OUTPATIENT)
Dept: ENDOCRINOLOGY | Facility: CLINIC | Age: 67
End: 2022-09-26

## 2022-09-26 VITALS
OXYGEN SATURATION: 96 % | DIASTOLIC BLOOD PRESSURE: 72 MMHG | SYSTOLIC BLOOD PRESSURE: 120 MMHG | BODY MASS INDEX: 51.91 KG/M2 | HEIGHT: 63 IN | HEART RATE: 101 BPM | WEIGHT: 293 LBS

## 2022-09-26 DIAGNOSIS — Z13.820 ENCOUNTER FOR SCREENING FOR OSTEOPOROSIS: ICD-10-CM

## 2022-09-26 PROCEDURE — 83036 HEMOGLOBIN GLYCOSYLATED A1C: CPT | Mod: QW

## 2022-09-26 PROCEDURE — 82962 GLUCOSE BLOOD TEST: CPT

## 2022-09-26 PROCEDURE — 99214 OFFICE O/P EST MOD 30 MIN: CPT | Mod: 25

## 2022-09-26 RX ORDER — DULAGLUTIDE 3 MG/.5ML
3 INJECTION, SOLUTION SUBCUTANEOUS
Qty: 12 | Refills: 1 | Status: DISCONTINUED | COMMUNITY
Start: 2021-09-16 | End: 2022-09-26

## 2022-09-27 NOTE — ASSESSMENT
[FreeTextEntry1] : 66 year old female with past medical history of Diabetes Mellitus Type 2, CAH on prednisone, Scleroderma, Hashimotos Hypothyroidism, Adrenal Nodules, Wheelchair bound with cane for short distances with ankle issues who presents for management\par \par 1. DM 2- uncontrolled, uncomplicated\par Patient counseled on the importance of diabetic control and complications. Patient's diet and the necessary changes discussed. Reviewed over glycemic goals. Blood sugars improving\par \par Cont Glimepiride 3 mg BID\par Stop Trulicity 3 mg qweekly\par Start Mounjaro\par Check fsg \par Cont diabetic diet\par Cont exercise\par Labs at follow up\par Opthalmology Visit needs follow up\par Foot Exam up to date\par \par 2. HLD- stable\par Cont Rosuvastatin\par \par 3. Hypothyroidism\par Cont Levothyroxine 175 mcg QD\par \par 4. CAH\par Unclear if patient really has CAH. But cannot dx until she is switched to lower doses of hydrocortisone\par Cont Prednisone \par Patient also likely needs the prednisone for her rheumatological problems\par She will establish with rheumatology for other treatment modalities\par

## 2022-09-27 NOTE — PHYSICAL EXAM
[Alert] : alert [Well Nourished] : well nourished [No Acute Distress] : no acute distress [Normal Voice/Communication] : normal voice communication [No Rash] : no rash [Oriented x3] : oriented to person, place, and time [de-identified] : wheelchair bound

## 2022-09-27 NOTE — HISTORY OF PRESENT ILLNESS
[FreeTextEntry1] : Ms. ASHLEY SANDOVAL  is a 66 year old female with past medical history of Diabetes Mellitus Type 2, CAH on prednisone, Scleroderma, Hashimoto's Hypothyroidism, Adrenal Nodules, Wheelchair bound with cane for short distances with ankle issues who presents for management. \par \par Re Diabetes\par She has had difficulty in controlling her  blood sugars. Patient denies any history of diabetic retinopathy, nephropathy or neuropathy. He denies any blurry vision, polyuria, polydipsia and numbness/tingling in the extremities.\par \par \par POCT Glucose: 207 mg/dL\par POCT Hga1c: 7.6 %\par \par \par Diabetes first diagnosed:years\par Type: 2\par \par Current diabetic regimen:\par Glimepiride 3 mg BID\par Tradjenta 5 mg QD (stopped)\par Trulicity 3 mg Qweekly\par Farxiga (yeast infection)\par \par Other relevant medications:\par \par Self monitoring blood glucose : 1x times per day:\par \par SMBG:\par Pre-breakfast:200s\par Pre-lunch:\par Pre-dinner:\par bedtime:\par \par Hypoglycemia:none\par \par Diet:Tries to maintain\par \par Exercise:Unable due to limites mobility\par \par Urine micro: trace prot (8/2021)\par lipid profile: (10/2021),  (7/2021)\par last hba1c:7.6% (9/2022), 7.2% (6/2022), 8% (2/2022), 7.9% (11/2021), 8.5% (7/2021)\par eye exam:none\par diabetic foot exam/podiatry:in office 9/2021\par \par Re CAH\par Patient was diagnosed while being managed by fertility specialists. She was placed on dexamethasone to help with fertility. She had 5 miscarriages. She was able to eventually able to have one child. She was always managed by fertility. She did see an endo 5 years ago but she did not have a formal evaluation for CAH. Her diabetes has been uncontrolled. She has gained a lot of weight. Attempt was made to switch from prednisone to hydrocortisone. Patient developed sob.\par \par Re Thyroid\par She was diagnosed with hypothyroidism in her 20s and she has been on thyroid replacement. She takes Levothyroxine 175 mcg QD. \par \par

## 2022-10-12 ENCOUNTER — RX RENEWAL (OUTPATIENT)
Age: 67
End: 2022-10-12

## 2022-11-30 ENCOUNTER — RX RENEWAL (OUTPATIENT)
Age: 67
End: 2022-11-30

## 2022-12-01 ENCOUNTER — RX RENEWAL (OUTPATIENT)
Age: 67
End: 2022-12-01

## 2023-01-18 ENCOUNTER — APPOINTMENT (OUTPATIENT)
Dept: ENDOCRINOLOGY | Facility: CLINIC | Age: 68
End: 2023-01-18
Payer: COMMERCIAL

## 2023-01-18 VITALS
SYSTOLIC BLOOD PRESSURE: 120 MMHG | WEIGHT: 293 LBS | BODY MASS INDEX: 51.91 KG/M2 | OXYGEN SATURATION: 96 % | HEART RATE: 84 BPM | HEIGHT: 63 IN | DIASTOLIC BLOOD PRESSURE: 80 MMHG

## 2023-01-18 VITALS
SYSTOLIC BLOOD PRESSURE: 120 MMHG | HEIGHT: 63.6 IN | OXYGEN SATURATION: 96 % | BODY MASS INDEX: 50.64 KG/M2 | HEART RATE: 84 BPM | WEIGHT: 293 LBS | DIASTOLIC BLOOD PRESSURE: 80 MMHG

## 2023-01-18 PROCEDURE — 99214 OFFICE O/P EST MOD 30 MIN: CPT

## 2023-01-18 RX ORDER — TIRZEPATIDE 2.5 MG/.5ML
2.5 INJECTION, SOLUTION SUBCUTANEOUS
Qty: 1 | Refills: 0 | Status: DISCONTINUED | COMMUNITY
Start: 2022-09-26 | End: 2023-01-18

## 2023-01-19 ENCOUNTER — APPOINTMENT (OUTPATIENT)
Dept: INTERNAL MEDICINE | Facility: CLINIC | Age: 68
End: 2023-01-19
Payer: COMMERCIAL

## 2023-01-19 VITALS
OXYGEN SATURATION: 95 % | BODY MASS INDEX: 51.91 KG/M2 | DIASTOLIC BLOOD PRESSURE: 81 MMHG | SYSTOLIC BLOOD PRESSURE: 153 MMHG | WEIGHT: 293 LBS | HEIGHT: 63 IN | HEART RATE: 111 BPM

## 2023-01-19 VITALS — DIASTOLIC BLOOD PRESSURE: 80 MMHG | SYSTOLIC BLOOD PRESSURE: 128 MMHG

## 2023-01-19 DIAGNOSIS — E61.1 IRON DEFICIENCY: ICD-10-CM

## 2023-01-19 DIAGNOSIS — Z00.00 ENCOUNTER FOR GENERAL ADULT MEDICAL EXAMINATION W/OUT ABNORMAL FINDINGS: ICD-10-CM

## 2023-01-19 PROCEDURE — 99214 OFFICE O/P EST MOD 30 MIN: CPT | Mod: 25

## 2023-01-19 PROCEDURE — 93000 ELECTROCARDIOGRAM COMPLETE: CPT | Mod: 59

## 2023-01-19 PROCEDURE — G0444 DEPRESSION SCREEN ANNUAL: CPT | Mod: NC,59

## 2023-01-19 PROCEDURE — 99397 PER PM REEVAL EST PAT 65+ YR: CPT | Mod: 25

## 2023-01-19 PROCEDURE — 36415 COLL VENOUS BLD VENIPUNCTURE: CPT

## 2023-01-19 NOTE — HISTORY OF PRESENT ILLNESS
[FreeTextEntry1] : CPE [de-identified] : Saundra is here today for CPE.  She has last seen here in February 2022.  She has a history of poorly controlled type 2 diabetes, scleroderma, and questionable ILD.  She has had a difficult time following through with diagnostics and follow-up appointments due to difficulty with ambulation.  We have been candid stressed the importance of completing and following up with her testing and specialist appointments.\par \par She complains of fatigue.\par

## 2023-01-19 NOTE — ASSESSMENT
[FreeTextEntry1] : . CPE\par Labs reviewed- \par Check iron panel for elevated platelets\par WBC likely from steroids, consider Heme eval\par Rx for mammo, cologuard, stool FIT\par UA with protein measurements\par \par 1. HTN\par Continue current regimen for now as previously well controlled\par \par 2. HLD\par Repeat\par \par 4. Hypothryoidism\par Slightly low TSH.  Will defer to endocrine for med dose changes.\par 5. ILD\par Advised importance of pulm follow up\par CT chest had been ordered by Rheum\par \par 6. Abnormal EKG\par Had prev been referred to [facility] but has not followed up\par Stressed importance of follow up\par \par 7. Liver lesion\par Has not done MRI\par Does not want to do MRI/CT\par Advised GI eval\par \par A "to do "last month the above and referral recommendations.  Discussed that it is extremely important that she follow-up with her specialist.  Many outstanding tasks due to poor adherence/compliance.  She is limited by her mobility.\par \par Follow-up in 3 months

## 2023-01-19 NOTE — PHYSICAL EXAM
[No Acute Distress] : no acute distress [Well Nourished] : well nourished [Well Developed] : well developed [Well-Appearing] : well-appearing [Normal Sclera/Conjunctiva] : normal sclera/conjunctiva [Thyroid Normal, No Nodules] : the thyroid was normal and there were no nodules present [No Respiratory Distress] : no respiratory distress  [No Accessory Muscle Use] : no accessory muscle use [Clear to Auscultation] : lungs were clear to auscultation bilaterally [Normal Rate] : normal rate  [Regular Rhythm] : with a regular rhythm [Normal S1, S2] : normal S1 and S2 [No Murmur] : no murmur heard [No Carotid Bruits] : no carotid bruits [No Abdominal Bruit] : a ~M bruit was not heard ~T in the abdomen [No Edema] : there was no peripheral edema [Normal Appearance] : normal in appearance [No Masses] : no palpable masses [No Axillary Lymphadenopathy] : no axillary lymphadenopathy [Soft] : abdomen soft [Non Tender] : non-tender [Non-distended] : non-distended [Normal Bowel Sounds] : normal bowel sounds [Normal Supraclavicular Nodes] : no supraclavicular lymphadenopathy [Normal Axillary Nodes] : no axillary lymphadenopathy [Normal Posterior Cervical Nodes] : no posterior cervical lymphadenopathy [Normal Anterior Cervical Nodes] : no anterior cervical lymphadenopathy [No CVA Tenderness] : no CVA  tenderness [No Spinal Tenderness] : no spinal tenderness [Normal Gait] : normal gait [Alert and Oriented x3] : oriented to person, place, and time

## 2023-01-19 NOTE — HISTORY OF PRESENT ILLNESS
[FreeTextEntry1] : Ms. ASHLEY SANDOVAL  is a 67 year old female with past medical history of Diabetes Mellitus Type 2, CAH on prednisone, Scleroderma, Hashimoto's Hypothyroidism, Adrenal Nodules, Wheelchair bound with cane for short distances with ankle issues who presents for management. \par \par Re Diabetes\par She has had difficulty in controlling her  blood sugars. Patient denies any history of diabetic retinopathy, nephropathy or neuropathy. He denies any blurry vision, polyuria, polydipsia and numbness/tingling in the extremities.\par \par \par POCT Glucose: 207 mg/dL\par POCT Hga1c: 7.6 %\par \par \par Diabetes first diagnosed:years\par Type: 2\par \par Current diabetic regimen:\par Glimepiride 3 mg BID\par MOunjaro 5 mg Qweekly\par Tradjenta 5 mg QD (stopped)\par Trulicity 3 mg Qweekly(stopped)\par Farxiga (yeast infection)\par \par Other relevant medications:\par \par Self monitoring blood glucose : 1x times per day:\par \par SMBG:\par Pre-breakfast:200s\par Pre-lunch:\par Pre-dinner:\par bedtime:\par \par Hypoglycemia:none\par \par Diet:Tries to maintain\par \par Exercise:Unable due to limites mobility\par \par Urine micro: trace prot (8/2021)\par lipid profile: (10/2021),  (7/2021)\par last hba1c:7.6% (9/2022), 7.2% (6/2022), 8% (2/2022), 7.9% (11/2021), 8.5% (7/2021)\par eye exam:none\par diabetic foot exam/podiatry:in office 9/2021\par \par Re CAH\par Patient was diagnosed while being managed by fertility specialists. She was placed on dexamethasone to help with fertility. She had 5 miscarriages. She was able to eventually able to have one child. She was always managed by fertility. She did see an endo 5 years ago but she did not have a formal evaluation for CAH. Her diabetes has been uncontrolled. She has gained a lot of weight. Attempt was made to switch from prednisone to hydrocortisone. Patient developed sob.\par \par Re Thyroid\par She was diagnosed with hypothyroidism in her 20s and she has been on thyroid replacement. She takes Levothyroxine 175 mcg QD. \par \par

## 2023-01-19 NOTE — PHYSICAL EXAM
[Alert] : alert [Well Nourished] : well nourished [No Acute Distress] : no acute distress [Normal Voice/Communication] : normal voice communication [No Rash] : no rash [Oriented x3] : oriented to person, place, and time [de-identified] : wheelchair bound

## 2023-01-19 NOTE — ASSESSMENT
[FreeTextEntry1] : 67 year old female with past medical history of Diabetes Mellitus Type 2, CAH on prednisone, Scleroderma, Hashimotos Hypothyroidism, Adrenal Nodules, Wheelchair bound with cane for short distances with ankle issues who presents for management\par \par 1. DM 2- uncontrolled, uncomplicated\par Patient counseled on the importance of diabetic control and complications. Patient's diet and the necessary changes discussed. Reviewed over glycemic goals. Blood sugars improving\par \par Cont Glimepiride 3 mg BID\par Inc to  Mounjaro 7.5 weekly\par Check fsg \par Cont diabetic diet\par Cont exercise\par Labs at follow up\par Opthalmology Visit needs follow up\par Foot Exam up to date\par \par 2. HLD- stable\par Cont Rosuvastatin\par \par 3. Hypothyroidism\par Cont Levothyroxine 175 mcg QD\par \par 4. CAH\par Unclear if patient really has CAH. But cannot dx until she is switched to lower doses of hydrocortisone\par Cont Prednisone \par Patient also likely needs the prednisone for her rheumatological problems\par She will establish with rheumatology for other treatment modalities\par

## 2023-01-19 NOTE — HEALTH RISK ASSESSMENT
[Never] : Never [0] : 2) Feeling down, depressed, or hopeless: Not at all (0) [PHQ-2 Negative - No further assessment needed] : PHQ-2 Negative - No further assessment needed [[id number]] : 0 [MammogramComments] : \par Overdue.  Rx order [PapSmearComments] : Overdue.  Advise GYN eval [BoneDensityComments] : Ordered by endocrinology [ColonoscopyComments] : Never had.  Do Cologuard

## 2023-01-19 NOTE — REVIEW OF SYSTEMS
[Fatigue] : fatigue [Dyspnea on Exertion] : dyspnea on exertion [Fever] : no fever [Chills] : no chills [Night Sweats] : no night sweats [Vision Problems] : no vision problems [Nasal Discharge] : no nasal discharge [Sore Throat] : no sore throat [Chest Pain] : no chest pain [Palpitations] : no palpitations [Shortness Of Breath] : no shortness of breath [Wheezing] : no wheezing [Cough] : no cough [Abdominal Pain] : no abdominal pain [Nausea] : no nausea [Constipation] : no constipation [Diarrhea] : diarrhea [Vomiting] : no vomiting [Hematuria] : no hematuria

## 2023-02-01 ENCOUNTER — NON-APPOINTMENT (OUTPATIENT)
Age: 68
End: 2023-02-01

## 2023-02-01 LAB
APPEARANCE: ABNORMAL
BACTERIA: ABNORMAL
BILIRUBIN URINE: NEGATIVE
BLOOD URINE: ABNORMAL
CHOLEST SERPL-MCNC: 215 MG/DL
COLOR: YELLOW
CREAT SPEC-SCNC: 87 MG/DL
CREAT SPEC-SCNC: 87 MG/DL
CREAT/PROT UR: 0.2 RATIO
FERRITIN SERPL-MCNC: 69 NG/ML
GLUCOSE QUALITATIVE U: NEGATIVE
HDLC SERPL-MCNC: 46 MG/DL
HYALINE CASTS: 7 /LPF
IRON SATN MFR SERPL: 9 %
IRON SERPL-MCNC: 37 UG/DL
KETONES URINE: NEGATIVE
LDLC SERPL CALC-MCNC: 136 MG/DL
LEUKOCYTE ESTERASE URINE: ABNORMAL
MICROALBUMIN 24H UR DL<=1MG/L-MCNC: 2.1 MG/DL
MICROALBUMIN/CREAT 24H UR-RTO: 23 MG/G
MICROSCOPIC-UA: NORMAL
NITRITE URINE: POSITIVE
NONHDLC SERPL-MCNC: 169 MG/DL
PH URINE: 8.5
PROT UR-MCNC: 14 MG/DL
PROTEIN URINE: NORMAL
RED BLOOD CELLS URINE: 6 /HPF
SPECIFIC GRAVITY URINE: 1.01
SQUAMOUS EPITHELIAL CELLS: 2 /HPF
TIBC SERPL-MCNC: 421 UG/DL
TRIGL SERPL-MCNC: 165 MG/DL
TRIPLE PHOSPHATE CRYSTALS: ABNORMAL
UIBC SERPL-MCNC: 384 UG/DL
URINE COMMENTS: NORMAL
UROBILINOGEN URINE: NORMAL
WHITE BLOOD CELLS URINE: 15 /HPF

## 2023-02-08 ENCOUNTER — NON-APPOINTMENT (OUTPATIENT)
Age: 68
End: 2023-02-08

## 2023-02-08 LAB
25(OH)D3 SERPL-MCNC: 32.9 NG/ML
ALBUMIN SERPL ELPH-MCNC: 3.9 G/DL
ALP BLD-CCNC: 102 U/L
ALT SERPL-CCNC: 21 U/L
ANION GAP SERPL CALC-SCNC: 12 MMOL/L
AST SERPL-CCNC: 20 U/L
BASOPHILS # BLD AUTO: 0.09 K/UL
BASOPHILS NFR BLD AUTO: 0.7 %
BILIRUB SERPL-MCNC: 0.3 MG/DL
BUN SERPL-MCNC: 12 MG/DL
CALCIUM SERPL-MCNC: 9.9 MG/DL
CHLORIDE SERPL-SCNC: 104 MMOL/L
CHOLEST SERPL-MCNC: 204 MG/DL
CO2 SERPL-SCNC: 25 MMOL/L
CREAT SERPL-MCNC: 0.79 MG/DL
EGFR: 83 ML/MIN/1.73M2
EOSINOPHIL # BLD AUTO: 0.11 K/UL
EOSINOPHIL NFR BLD AUTO: 0.9 %
ESTIMATED AVERAGE GLUCOSE: 177 MG/DL
FOLATE SERPL-MCNC: >20 NG/ML
FRUCTOSAMINE SERPL-MCNC: 242 UMOL/L
GLUCOSE SERPL-MCNC: 195 MG/DL
HBA1C MFR BLD HPLC: 7.8 %
HCT VFR BLD CALC: 46.4 %
HDLC SERPL-MCNC: 40 MG/DL
HGB BLD-MCNC: 14 G/DL
IMM GRANULOCYTES NFR BLD AUTO: 1.1 %
LDLC SERPL CALC-MCNC: 131 MG/DL
LYMPHOCYTES # BLD AUTO: 1.76 K/UL
LYMPHOCYTES NFR BLD AUTO: 14.2 %
MAN DIFF?: NORMAL
MCHC RBC-ENTMCNC: 26.2 PG
MCHC RBC-ENTMCNC: 30.2 GM/DL
MCV RBC AUTO: 86.7 FL
MONOCYTES # BLD AUTO: 1.04 K/UL
MONOCYTES NFR BLD AUTO: 8.4 %
NEUTROPHILS # BLD AUTO: 9.25 K/UL
NEUTROPHILS NFR BLD AUTO: 74.7 %
NONHDLC SERPL-MCNC: 163 MG/DL
PLATELET # BLD AUTO: 411 K/UL
POTASSIUM SERPL-SCNC: 4.7 MMOL/L
PROT SERPL-MCNC: 7 G/DL
RBC # BLD: 5.35 M/UL
RBC # FLD: 16.5 %
SODIUM SERPL-SCNC: 141 MMOL/L
TRIGL SERPL-MCNC: 162 MG/DL
TSH SERPL-ACNC: 0.19 UIU/ML
VIT B12 SERPL-MCNC: 883 PG/ML
WBC # FLD AUTO: 12.39 K/UL

## 2023-02-11 ENCOUNTER — RX RENEWAL (OUTPATIENT)
Age: 68
End: 2023-02-11

## 2023-02-12 ENCOUNTER — RX RENEWAL (OUTPATIENT)
Age: 68
End: 2023-02-12

## 2023-04-10 ENCOUNTER — APPOINTMENT (OUTPATIENT)
Dept: ENDOCRINOLOGY | Facility: CLINIC | Age: 68
End: 2023-04-10
Payer: COMMERCIAL

## 2023-04-10 ENCOUNTER — LABORATORY RESULT (OUTPATIENT)
Age: 68
End: 2023-04-10

## 2023-04-10 VITALS
HEIGHT: 63 IN | BODY MASS INDEX: 51.91 KG/M2 | WEIGHT: 293 LBS | DIASTOLIC BLOOD PRESSURE: 84 MMHG | OXYGEN SATURATION: 96 % | RESPIRATION RATE: 18 BRPM | HEART RATE: 102 BPM | SYSTOLIC BLOOD PRESSURE: 150 MMHG

## 2023-04-10 DIAGNOSIS — N39.0 URINARY TRACT INFECTION, SITE NOT SPECIFIED: ICD-10-CM

## 2023-04-10 PROCEDURE — 83036 HEMOGLOBIN GLYCOSYLATED A1C: CPT | Mod: QW

## 2023-04-10 PROCEDURE — 82962 GLUCOSE BLOOD TEST: CPT

## 2023-04-10 PROCEDURE — 99214 OFFICE O/P EST MOD 30 MIN: CPT | Mod: 25

## 2023-04-10 NOTE — ASSESSMENT
[FreeTextEntry1] : 67 year old female with past medical history of Diabetes Mellitus Type 2, CAH on prednisone, Scleroderma, Hashimotos Hypothyroidism, Adrenal Nodules, Wheelchair bound with cane for short distances with ankle issues who presents for management\par \par 1. DM 2- uncontrolled, uncomplicated\par Patient counseled on the importance of diabetic control and complications. Patient's diet and the necessary changes discussed. Reviewed over glycemic goals. Blood sugars improving\par \par Cont Glimepiride 3 mg BID\par Cont Mounjaro 7.5 weekly\par Check fsg \par Cont diabetic diet\par Cont exercise\par Labs at follow up\par Opthalmology Visit needs follow up\par Foot Exam up to date\par \par 2. HLD- stable\par Cont Rosuvastatin\par \par 3. Hypothyroidism\par Cont Levothyroxine 150 mcg QD\par Will check levels today\par \par 4. CAH\par Unclear if patient really has CAH. But cannot dx until she is switched to lower doses of hydrocortisone\par Cont Prednisone \par Patient also likely needs the prednisone for her rheumatological problems\par She will establish with rheumatology for other treatment modalities\par

## 2023-04-10 NOTE — HISTORY OF PRESENT ILLNESS
[FreeTextEntry1] : Ms. ASHLEY SANDOVAL  is a 67 year old female with past medical history of Diabetes Mellitus Type 2, CAH on prednisone, Scleroderma, Hashimoto's Hypothyroidism, Adrenal Nodules, Wheelchair bound with cane for short distances with ankle issues who presents for management. \par \par Re Diabetes\par She has had difficulty in controlling her  blood sugars. Patient denies any history of diabetic retinopathy, nephropathy or neuropathy. He denies any blurry vision, polyuria, polydipsia and numbness/tingling in the extremities.\par \par \par POCT Glucose: 124 mg/dL\par POCT Hga1c: 6.9 %\par \par \par Diabetes first diagnosed:years\par Type: 2\par \par Current diabetic regimen:\par Glimepiride 3 mg BID\par Mounjaro 7.5 mg Qweekly\par \par Tradjenta 5 mg QD (stopped)\par Trulicity 3 mg Qweekly(stopped)\par Farxiga (yeast infection)\par \par Other relevant medications:\par \par Self monitoring blood glucose : 1x times per day:\par \par SMBG:\par Pre-breakfast:200s\par Pre-lunch:\par Pre-dinner:\par bedtime:\par \par Hypoglycemia:none\par \par Diet:Tries to maintain\par \par Exercise:Unable due to limites mobility\par \par Urine micro: wnl (1/2023), trace prot (8/2021)\par lipid profile: (2/2023),  (10/2021),  (7/2021)\par last hba1c:6.9% (4/2023), 7.6% (9/2022), 7.2% (6/2022), 8% (2/2022), 7.9% (11/2021), 8.5% (7/2021)\par eye exam:5/2022\par diabetic foot exam/podiatry:in office 9/2021\par \par Re CAH\par Patient was diagnosed while being managed by fertility specialists. She was placed on dexamethasone to help with fertility. She had 5 miscarriages. She was able to eventually able to have one child. She was always managed by fertility. She did see an endo 5 years ago but she did not have a formal evaluation for CAH. Her diabetes has been uncontrolled. She has gained a lot of weight. Attempt was made to switch from prednisone to hydrocortisone. Patient developed sob.\par \par Re Thyroid\par She was diagnosed with hypothyroidism in her 20s and she has been on thyroid replacement. She takes Levothyroxine 150 mcg QD. \par \par  [FreeTextEntry1] : Dr Macias for f/u

## 2023-04-10 NOTE — PHYSICAL EXAM
[Alert] : alert [Well Nourished] : well nourished [No Acute Distress] : no acute distress [Normal Voice/Communication] : normal voice communication [No Rash] : no rash [Oriented x3] : oriented to person, place, and time [de-identified] : wheelchair bound

## 2023-04-13 LAB
APPEARANCE: ABNORMAL
BILIRUBIN URINE: NEGATIVE
BLOOD URINE: ABNORMAL
COLOR: YELLOW
GLUCOSE QUALITATIVE U: NEGATIVE
KETONES URINE: NEGATIVE
LEUKOCYTE ESTERASE URINE: ABNORMAL
NITRITE URINE: NEGATIVE
PH URINE: 6
PROTEIN URINE: NEGATIVE
SPECIFIC GRAVITY URINE: 1.01
T3 SERPL-MCNC: 103 NG/DL
T4 FREE SERPL-MCNC: 1.4 NG/DL
TSH SERPL-ACNC: 0.46 UIU/ML
UROBILINOGEN URINE: NORMAL

## 2023-05-02 ENCOUNTER — APPOINTMENT (OUTPATIENT)
Dept: INTERNAL MEDICINE | Facility: CLINIC | Age: 68
End: 2023-05-02
Payer: COMMERCIAL

## 2023-05-02 VITALS — DIASTOLIC BLOOD PRESSURE: 84 MMHG | SYSTOLIC BLOOD PRESSURE: 138 MMHG

## 2023-05-02 VITALS
HEART RATE: 111 BPM | SYSTOLIC BLOOD PRESSURE: 143 MMHG | HEIGHT: 63 IN | BODY MASS INDEX: 51.91 KG/M2 | RESPIRATION RATE: 12 BRPM | OXYGEN SATURATION: 98 % | DIASTOLIC BLOOD PRESSURE: 72 MMHG | WEIGHT: 293 LBS

## 2023-05-02 DIAGNOSIS — N39.0 URINARY TRACT INFECTION, SITE NOT SPECIFIED: ICD-10-CM

## 2023-05-02 PROCEDURE — 81003 URINALYSIS AUTO W/O SCOPE: CPT | Mod: QW

## 2023-05-02 PROCEDURE — 99213 OFFICE O/P EST LOW 20 MIN: CPT | Mod: 25

## 2023-05-02 RX ORDER — ROSUVASTATIN CALCIUM 5 MG/1
5 TABLET, FILM COATED ORAL DAILY
Qty: 90 | Refills: 0 | Status: ACTIVE | COMMUNITY
Start: 2021-04-16 | End: 1900-01-01

## 2023-05-02 RX ORDER — CHOLECALCIFEROL (VITAMIN D3) 50 MCG
50 MCG TABLET ORAL
Qty: 90 | Refills: 1 | Status: ACTIVE | COMMUNITY
Start: 2022-02-14 | End: 1900-01-01

## 2023-05-02 RX ORDER — NITROFURANTOIN (MONOHYDRATE/MACROCRYSTALS) 25; 75 MG/1; MG/1
100 CAPSULE ORAL TWICE DAILY
Qty: 10 | Refills: 0 | Status: DISCONTINUED | COMMUNITY
Start: 2023-04-18 | End: 2023-05-02

## 2023-05-02 RX ORDER — FLUCONAZOLE 150 MG/1
150 TABLET ORAL
Qty: 1 | Refills: 0 | Status: DISCONTINUED | COMMUNITY
Start: 2023-04-18 | End: 2023-05-02

## 2023-05-02 NOTE — PHYSICAL EXAM
[No Acute Distress] : no acute distress [Well Nourished] : well nourished [Well Developed] : well developed [Well-Appearing] : well-appearing [No Respiratory Distress] : no respiratory distress  [Alert and Oriented x3] : oriented to person, place, and time

## 2023-05-02 NOTE — ASSESSMENT
[FreeTextEntry1] : 1.  UTI\par Symptoms have resolved.  Point-of-care urinalysis is normal.\par \par 2.  Hypertension\par Blood pressure slightly higher than her normal baseline.  We will recheck at her follow-up appointment at end the month

## 2023-05-02 NOTE — REVIEW OF SYSTEMS
[Fever] : no fever [Chills] : no chills [Night Sweats] : no night sweats [Dysuria] : no dysuria [Incontinence] : no incontinence [Hematuria] : no hematuria [Frequency] : no frequency

## 2023-05-02 NOTE — HISTORY OF PRESENT ILLNESS
[FreeTextEntry1] : UTI [de-identified] : Saundra presents today for follow-up of UTI.  She was treated with Macrobid.  She not require fluconazole this time.  Urinary symptoms have resolved.

## 2023-05-25 ENCOUNTER — APPOINTMENT (OUTPATIENT)
Dept: INTERNAL MEDICINE | Facility: CLINIC | Age: 68
End: 2023-05-25

## 2023-09-12 ENCOUNTER — APPOINTMENT (OUTPATIENT)
Dept: ENDOCRINOLOGY | Facility: CLINIC | Age: 68
End: 2023-09-12
Payer: COMMERCIAL

## 2023-09-12 VITALS
WEIGHT: 293 LBS | HEART RATE: 99 BPM | OXYGEN SATURATION: 98 % | BODY MASS INDEX: 51.91 KG/M2 | HEIGHT: 63 IN | DIASTOLIC BLOOD PRESSURE: 82 MMHG | SYSTOLIC BLOOD PRESSURE: 136 MMHG

## 2023-09-12 DIAGNOSIS — E55.9 VITAMIN D DEFICIENCY, UNSPECIFIED: ICD-10-CM

## 2023-09-12 PROCEDURE — 83036 HEMOGLOBIN GLYCOSYLATED A1C: CPT | Mod: QW

## 2023-09-12 PROCEDURE — 82962 GLUCOSE BLOOD TEST: CPT

## 2023-09-12 PROCEDURE — 99214 OFFICE O/P EST MOD 30 MIN: CPT | Mod: 25

## 2023-09-12 RX ORDER — LANCETS 33 GAUGE
EACH MISCELLANEOUS
Qty: 2 | Refills: 3 | Status: ACTIVE | COMMUNITY
Start: 2023-09-12 | End: 1900-01-01

## 2023-09-12 RX ORDER — BLOOD-GLUCOSE METER
W/DEVICE EACH MISCELLANEOUS
Qty: 1 | Refills: 0 | Status: ACTIVE | COMMUNITY
Start: 2023-09-12 | End: 1900-01-01

## 2023-09-12 RX ORDER — BLOOD SUGAR DIAGNOSTIC
STRIP MISCELLANEOUS TWICE DAILY
Qty: 2 | Refills: 3 | Status: ACTIVE | COMMUNITY
Start: 2023-09-12 | End: 1900-01-01

## 2023-10-20 ENCOUNTER — RX RENEWAL (OUTPATIENT)
Age: 68
End: 2023-10-20

## 2024-01-08 ENCOUNTER — APPOINTMENT (OUTPATIENT)
Dept: ENDOCRINOLOGY | Facility: CLINIC | Age: 69
End: 2024-01-08
Payer: COMMERCIAL

## 2024-01-08 VITALS
SYSTOLIC BLOOD PRESSURE: 137 MMHG | WEIGHT: 281 LBS | HEART RATE: 90 BPM | DIASTOLIC BLOOD PRESSURE: 84 MMHG | RESPIRATION RATE: 18 BRPM | OXYGEN SATURATION: 98 %

## 2024-01-08 PROCEDURE — 99214 OFFICE O/P EST MOD 30 MIN: CPT | Mod: 25

## 2024-01-08 PROCEDURE — 82962 GLUCOSE BLOOD TEST: CPT

## 2024-01-08 PROCEDURE — 83036 HEMOGLOBIN GLYCOSYLATED A1C: CPT | Mod: QW

## 2024-01-08 RX ORDER — GLIMEPIRIDE 2 MG/1
2 TABLET ORAL
Qty: 180 | Refills: 2 | Status: DISCONTINUED | COMMUNITY
Start: 2021-07-28 | End: 2024-01-08

## 2024-01-08 RX ORDER — GLIMEPIRIDE 1 MG/1
1 TABLET ORAL
Qty: 180 | Refills: 2 | Status: ACTIVE | COMMUNITY
Start: 2022-06-27 | End: 1900-01-01

## 2024-01-08 RX ORDER — PREDNISONE 5 MG/1
5 TABLET ORAL
Qty: 90 | Refills: 2 | Status: ACTIVE | COMMUNITY
Start: 2022-01-03 | End: 1900-01-01

## 2024-01-19 LAB
ABO + RH PNL BLD: NORMAL
ALBUMIN SERPL ELPH-MCNC: 4.1 G/DL
ALP BLD-CCNC: 99 U/L
ALT SERPL-CCNC: 18 U/L
ANION GAP SERPL CALC-SCNC: 17 MMOL/L
AST SERPL-CCNC: 18 U/L
BILIRUB SERPL-MCNC: 0.4 MG/DL
BUN SERPL-MCNC: 12 MG/DL
CALCIUM SERPL-MCNC: 9.6 MG/DL
CHLORIDE SERPL-SCNC: 105 MMOL/L
CHOLEST SERPL-MCNC: 195 MG/DL
CO2 SERPL-SCNC: 19 MMOL/L
CREAT SERPL-MCNC: 0.8 MG/DL
EGFR: 81 ML/MIN/1.73M2
FOLATE SERPL-MCNC: 12.8 NG/ML
FRUCTOSAMINE SERPL-MCNC: 199 UMOL/L
GLUCOSE SERPL-MCNC: 91 MG/DL
HCT VFR BLD CALC: 46.6 %
HDLC SERPL-MCNC: 44 MG/DL
HGB BLD-MCNC: 14.5 G/DL
LDLC SERPL CALC-MCNC: 124 MG/DL
MCHC RBC-ENTMCNC: 27.1 PG
MCHC RBC-ENTMCNC: 31.1 GM/DL
MCV RBC AUTO: 87.1 FL
NONHDLC SERPL-MCNC: 151 MG/DL
PLATELET # BLD AUTO: 408 K/UL
POTASSIUM SERPL-SCNC: 4.1 MMOL/L
PROT SERPL-MCNC: 6.9 G/DL
RBC # BLD: 5.35 M/UL
RBC # FLD: 15.7 %
SODIUM SERPL-SCNC: 141 MMOL/L
T4 FREE SERPL-MCNC: 1.8 NG/DL
TRIGL SERPL-MCNC: 150 MG/DL
TSH SERPL-ACNC: 0.19 UIU/ML
VIT B12 SERPL-MCNC: 966 PG/ML
WBC # FLD AUTO: 13.43 K/UL

## 2024-01-19 RX ORDER — LEVOTHYROXINE SODIUM 0.14 MG/1
137 TABLET ORAL
Qty: 90 | Refills: 2 | Status: ACTIVE | COMMUNITY
Start: 2022-09-10 | End: 1900-01-01

## 2024-02-28 ENCOUNTER — APPOINTMENT (OUTPATIENT)
Dept: RHEUMATOLOGY | Facility: CLINIC | Age: 69
End: 2024-02-28
Payer: COMMERCIAL

## 2024-03-07 NOTE — QUALITY MEASURES
[Visual inspection, sensory exam] : Foot exam, including visual inspection, sensory exam with mono filament, and pulse exam, was performed within the last 12 months Speaking Coherently

## 2024-04-09 ENCOUNTER — APPOINTMENT (OUTPATIENT)
Dept: INTERNAL MEDICINE | Facility: CLINIC | Age: 69
End: 2024-04-09
Payer: COMMERCIAL

## 2024-04-09 VITALS — DIASTOLIC BLOOD PRESSURE: 90 MMHG | SYSTOLIC BLOOD PRESSURE: 144 MMHG

## 2024-04-09 VITALS
HEIGHT: 63 IN | DIASTOLIC BLOOD PRESSURE: 90 MMHG | BODY MASS INDEX: 48.73 KG/M2 | RESPIRATION RATE: 12 BRPM | OXYGEN SATURATION: 97 % | HEART RATE: 86 BPM | WEIGHT: 275 LBS | SYSTOLIC BLOOD PRESSURE: 150 MMHG

## 2024-04-09 DIAGNOSIS — K76.9 LIVER DISEASE, UNSPECIFIED: ICD-10-CM

## 2024-04-09 DIAGNOSIS — E78.5 HYPERLIPIDEMIA, UNSPECIFIED: ICD-10-CM

## 2024-04-09 DIAGNOSIS — I10 ESSENTIAL (PRIMARY) HYPERTENSION: ICD-10-CM

## 2024-04-09 PROCEDURE — 99213 OFFICE O/P EST LOW 20 MIN: CPT

## 2024-04-09 RX ORDER — FOLIC ACID 1 MG/1
1 TABLET ORAL
Qty: 90 | Refills: 1 | Status: ACTIVE | COMMUNITY
Start: 2022-02-14 | End: 1900-01-01

## 2024-04-09 RX ORDER — LANSOPRAZOLE 30 MG/1
30 CAPSULE, DELAYED RELEASE ORAL DAILY
Qty: 90 | Refills: 0 | Status: ACTIVE | COMMUNITY
Start: 2022-09-10 | End: 1900-01-01

## 2024-04-09 RX ORDER — ALBUTEROL SULFATE 90 UG/1
108 (90 BASE) INHALANT RESPIRATORY (INHALATION)
Qty: 1 | Refills: 2 | Status: ACTIVE | COMMUNITY
Start: 2021-10-15 | End: 1900-01-01

## 2024-04-09 RX ORDER — MONTELUKAST 10 MG/1
10 TABLET, FILM COATED ORAL
Qty: 90 | Refills: 0 | Status: ACTIVE | COMMUNITY
Start: 2022-11-30 | End: 1900-01-01

## 2024-04-09 RX ORDER — DICLOFENAC SODIUM 75 MG/1
75 TABLET, DELAYED RELEASE ORAL
Qty: 90 | Refills: 0 | Status: ACTIVE | COMMUNITY
Start: 2021-08-13 | End: 1900-01-01

## 2024-04-09 RX ORDER — ENALAPRIL MALEATE 10 MG/1
10 TABLET ORAL
Qty: 90 | Refills: 0 | Status: ACTIVE | COMMUNITY
Start: 2022-06-07 | End: 1900-01-01

## 2024-04-09 RX ORDER — FLUTICASONE PROPIONATE 50 UG/1
50 SPRAY, METERED NASAL
Qty: 48 | Refills: 0 | Status: ACTIVE | COMMUNITY
Start: 2021-12-13 | End: 1900-01-01

## 2024-04-09 RX ORDER — NITROFURANTOIN (MONOHYDRATE/MACROCRYSTALS) 25; 75 MG/1; MG/1
100 CAPSULE ORAL TWICE DAILY
Qty: 10 | Refills: 0 | Status: DISCONTINUED | COMMUNITY
Start: 2023-02-01 | End: 2024-04-09

## 2024-04-14 PROBLEM — E78.5 HLD (HYPERLIPIDEMIA): Status: ACTIVE | Noted: 2021-04-08

## 2024-04-14 PROBLEM — I10 HTN (HYPERTENSION): Status: ACTIVE | Noted: 2021-04-08

## 2024-04-14 PROBLEM — K76.9 LIVER LESION: Status: ACTIVE | Noted: 2021-04-09

## 2024-04-14 NOTE — ASSESSMENT
[FreeTextEntry1] : 1. Hypertension Blood pressure improved on manual recheck   2. Scleroderma with ILD Needs Pulm and Rheum follow up Advised  3. Liver lesion Has not done imaging or seen GI/liver  4. Letter provided for son   F/U for CPE Discussed importance of follow up with specialists, ordered test, etc.

## 2024-04-14 NOTE — REVIEW OF SYSTEMS
[Fever] : no fever [Chills] : no chills [Night Sweats] : no night sweats [Chest Pain] : no chest pain [Shortness Of Breath] : no shortness of breath [Abdominal Pain] : no abdominal pain [Dysuria] : no dysuria [Incontinence] : no incontinence [Hematuria] : no hematuria [Frequency] : no frequency

## 2024-04-14 NOTE — HISTORY OF PRESENT ILLNESS
[FreeTextEntry1] : f/u [de-identified] : Saundra is here today for follow-up.  She was last seen for UTI in May 2023.  She has been following up with endocrinology.  She reports no major changes in her medical status though has increasing difficulty at home with poor mobility from her scleroderma and foot deformities. She still has not seen Rheumatology or followed up for her liver lesion or with pulmonary. Relies on her son for help at home including transport and with some ADLs.

## 2024-04-14 NOTE — PHYSICAL EXAM
[No Acute Distress] : no acute distress [Well Nourished] : well nourished [Well Developed] : well developed [Well-Appearing] : well-appearing [No Accessory Muscle Use] : no accessory muscle use [No Respiratory Distress] : no respiratory distress  [Normal Rate] : normal rate  [Normal S1, S2] : normal S1 and S2 [Regular Rhythm] : with a regular rhythm [No Edema] : there was no peripheral edema [Alert and Oriented x3] : oriented to person, place, and time [de-identified] : +coarse bibasilar crackles

## 2024-04-22 ENCOUNTER — APPOINTMENT (OUTPATIENT)
Dept: CT IMAGING | Facility: CLINIC | Age: 69
End: 2024-04-22
Payer: COMMERCIAL

## 2024-04-22 PROCEDURE — 71250 CT THORAX DX C-: CPT

## 2024-04-25 ENCOUNTER — TRANSCRIPTION ENCOUNTER (OUTPATIENT)
Age: 69
End: 2024-04-25

## 2024-05-08 ENCOUNTER — LABORATORY RESULT (OUTPATIENT)
Age: 69
End: 2024-05-08

## 2024-05-08 ENCOUNTER — APPOINTMENT (OUTPATIENT)
Dept: RHEUMATOLOGY | Facility: CLINIC | Age: 69
End: 2024-05-08
Payer: COMMERCIAL

## 2024-05-08 VITALS
HEIGHT: 63 IN | OXYGEN SATURATION: 99 % | SYSTOLIC BLOOD PRESSURE: 140 MMHG | DIASTOLIC BLOOD PRESSURE: 70 MMHG | BODY MASS INDEX: 47.84 KG/M2 | WEIGHT: 270 LBS | HEART RATE: 100 BPM

## 2024-05-08 DIAGNOSIS — M25.531 PAIN IN RIGHT WRIST: ICD-10-CM

## 2024-05-08 DIAGNOSIS — R20.2 PARESTHESIA OF SKIN: ICD-10-CM

## 2024-05-08 PROCEDURE — G2211 COMPLEX E/M VISIT ADD ON: CPT

## 2024-05-08 PROCEDURE — 99215 OFFICE O/P EST HI 40 MIN: CPT

## 2024-05-09 LAB
BASOPHILS # BLD AUTO: 0.1 K/UL
BASOPHILS NFR BLD AUTO: 0.8 %
C3 SERPL-MCNC: 154 MG/DL
C4 SERPL-MCNC: 25 MG/DL
DSDNA AB SER-ACNC: 2 IU/ML
ENA SCL70 IGG SER IA-ACNC: <0.2 AL
EOSINOPHIL # BLD AUTO: 0.16 K/UL
EOSINOPHIL NFR BLD AUTO: 1.3 %
ERYTHROCYTE [SEDIMENTATION RATE] IN BLOOD BY WESTERGREN METHOD: 30 MM/HR
HCT VFR BLD CALC: 46.8 %
HGB BLD-MCNC: 14.5 G/DL
IMM GRANULOCYTES NFR BLD AUTO: 0.9 %
LYMPHOCYTES # BLD AUTO: 1.67 K/UL
LYMPHOCYTES NFR BLD AUTO: 13.6 %
MAN DIFF?: NORMAL
MCHC RBC-ENTMCNC: 26.8 PG
MCHC RBC-ENTMCNC: 31 GM/DL
MCV RBC AUTO: 86.5 FL
MONOCYTES # BLD AUTO: 1.16 K/UL
MONOCYTES NFR BLD AUTO: 9.5 %
NEUTROPHILS # BLD AUTO: 9.04 K/UL
NEUTROPHILS NFR BLD AUTO: 73.9 %
PLATELET # BLD AUTO: 386 K/UL
RBC # BLD: 5.41 M/UL
RBC # FLD: 17.1 %
WBC # FLD AUTO: 12.24 K/UL

## 2024-05-10 PROBLEM — M25.531 RIGHT WRIST PAIN: Status: ACTIVE | Noted: 2024-05-10

## 2024-05-10 LAB
ALBUMIN SERPL ELPH-MCNC: 4.3 G/DL
ALP BLD-CCNC: 105 U/L
ALT SERPL-CCNC: 10 U/L
ANION GAP SERPL CALC-SCNC: 23 MMOL/L
AST SERPL-CCNC: 17 U/L
BILIRUB SERPL-MCNC: 0.3 MG/DL
BUN SERPL-MCNC: 12 MG/DL
CALCIUM SERPL-MCNC: 9.8 MG/DL
CHLORIDE SERPL-SCNC: 106 MMOL/L
CO2 SERPL-SCNC: 15 MMOL/L
CREAT SERPL-MCNC: 0.78 MG/DL
CRP SERPL-MCNC: 21 MG/L
EGFR: 84 ML/MIN/1.73M2
FERRITIN SERPL-MCNC: 98 NG/ML
IRON SERPL-MCNC: 39 UG/DL
PCA AB SER QL IF: NORMAL
POTASSIUM SERPL-SCNC: 4.6 MMOL/L
PROT SERPL-MCNC: 7.2 G/DL
SODIUM SERPL-SCNC: 143 MMOL/L
VIT B12 SERPL-MCNC: 608 PG/ML

## 2024-05-13 LAB — RNA POLYMERASE III IGG: >150 UNITS

## 2024-05-13 NOTE — PHYSICAL EXAM
[General Appearance - Alert] : alert [General Appearance - In No Acute Distress] : in no acute distress [] : no respiratory distress [Respiration, Rhythm And Depth] : normal respiratory rhythm and effort [Exaggerated Use Of Accessory Muscles For Inspiration] : no accessory muscle use [Auscultation Breath Sounds / Voice Sounds] : lungs were clear to auscultation bilaterally [Oriented To Time, Place, And Person] : oriented to person, place, and time [Affect] : the affect was normal [Mood] : the mood was normal [FreeTextEntry1] : no joint synovitis noted in any joint; b/l ankles/feet noted to be everted.

## 2024-05-13 NOTE — ASSESSMENT
[FreeTextEntry1] : Ms. Viveros is a non-smoking female with longstanding scleroderma (dx 2001), with skin tightening, Raynauds, dysphagia, and mild ILD (from CT chest 3/2021), here for follow-up of her scleroderma management.  # polyarthralgias/ polyarthritis -- d/w patient at length - very limiting  -- unclear if wrist arthritis is related to scleroderma or overlap process as it appears a lot like RA -- given Scl/ILD and now with arthritis - would recommended Tocilizumab (Actemra) Injections for the patient  -- x-rays ordered for the patient -- check inflammatory markers  -- check serologies and sub-serologies -- check activity monitoring labs  #Scleroderma dx 2001 with skin tightening, RP, dysphagia a mild ILD.  Now also with GERD, telangiectasias and what appears to be calcinosis - extensive -- in terms of lungs:   evaluate extent of ILD and it's progression.  d/w patient tocilizumab and r/b/a discussed.  f/u pulmonary, PFT, walking time, CT chest -- in terms of high risk for pHTN - rec echo and cardio  -- in terms of GERD - continue GI evaluation which has most recently disclosed an enlarged esophagus and is c/w her condition -- in terms of RP - controlled at present - protection for now - though depending on progression may benefit from medical intervention eg ccb - in terms of calcinosis - referral for the patient to go see Dr. Morris Muniz M.D. for the calcinosis  #medical mgmt, long term use of drug  -- d/w patient at length long terms steroids - would rec taper as tolerated and guided by endo -- possible new medication start - tocilizumab - r/b/a discussed. This can be given either as a monthly infusion or as a once every 2 week home injection.  I discussed the potential side effects of injection site reaction (erythema at the site which generally resolves as injections continue) or infusion reaction (for which pre-medications, tylenol and benadryl, are given to help prevent this possible side effect).  We reviewed the potential for  immune suppression and a possible increased risk of infection - Since tuberculosis infection can become severe in patients on biologic mediations, all patients must have a negative PPD or Quantiferon test prior to beginning therapy with any of these medications.  A yearly PPD or Quantiferon must also be performed. Cell counts, liver function, and kidney function will be monitored routinely for screening for any toxicity.  Patients on tocilizumab may rarely develop elevated cholesterol levels which will also be monitored.  -- While on immunosuppressive medication,  no live-virus vaccines should be given.      Today's medical care services serve as the continuing focal point for needed health care services that are part of ongoing care related to a patient's single, serious condition or a complex condition.  More than 50% of the encounter was spent counseling the patient on differential, workup, disease course and treatment/management.  Education was provided to the patient during this encounter.  All questions and concerns were addressed and answered.   The patient verbalized understanding and agreed to the plan.   Patient has been instructed to call for an appointment if new symptoms develop. Patient has been instructed to make a follow-up appointment in 3 weeks.   Time spent on the encounter included, but is not limited to, preparing to see the patient, obtaining and/or reviewing separately obtained history, performing the evaluation, counseling and educating, independently interpreting results with communication to patient, order placement, referring and/or communicating with other health professionals as described, and documenting clinical information in the electronic health record.

## 2024-05-13 NOTE — ADDENDUM
[FreeTextEntry1] : This note was written by Nati Wilkins, acting as the  for Dr. Sam. This note accurately reflects the work and decisions made by Dr. Sam.

## 2024-05-13 NOTE — HISTORY OF PRESENT ILLNESS
[___ Month(s) Ago] : [unfilled] month(s) ago [FreeTextEntry1] : Ms. Viveros is a non-smoking female with scleroderma (diagnosed 2001- sx of skin tightening in hands and around mouth, Raynauds, telangiectasias, reported calcinosis, dysphagia, and recent onset ILD), Hashimoto's thyroiditis, HTN, HLD, DM2, congenital adrenal hyperplasia (reported and in chart, on chronic steroids, currently on prednisone 5 mg QD), wheelchair bound recently due to dyspnea + b/l ankles deformity, here for follow-up of her scleroderma treatment.  INTERVAL HISTORY  patient lost to f/u  here to re-establish care  multiple issues to discuss - high risk patient with complicated condition  in [facility] if pain - experiencing significant pain in her hands - ambulates with the use of a wheelchair and carries a cane for additional support when standing - bilateral wrist effusion (right wrist is worse than the left wrist) - takes Voltaren tablets for the pain - patient is allergic to Aspirin - states she has trouble walking long distances and even to walk down the hopper in the office today she was getting shortness of breath  in terms of the scleroderma - skin tightness in the bilateral fingers - the patient has calcinosis in the legs and fingers - states it feels like little rocks are under her skin and hurt to touch - endorses GERD and esophageal dilitation - endorses h/o RP - telangiectasias on her face - always had an issue with swallowing since she was a kid - has a distended esophagus from the scleroderma lung disease / interstitial lung disease  General ROS  - had covid in March 2024 (sick for 2.5 months and 4 weeks of fever) - she is currently on Mounjaro for weight loss and need to increase her dose, but has been unable to due to the shortages - eats 2 small meals a day, because of her limited ability to get up and move on her own  --------------------------------------------------------  BACKGROUND INFORMATION Previously was seeing Dr. Spicer- as mentioned above was on prednisone 5 mg QD (though more for adrenal issues than for scleroderma) and diclofenac 75 mg BID for years (though takes it once daily at this time). Other doctors (endocrine) have tried to taper this steroid dosage (pt recalls being switched to hydrocortisone recently, however was unable to wean steroids due to her breathing getting worse with lower dose of steroids).  Pt had a CT chest 3/2021 showing mild ILD in lung bases- has not repeated since then. Has not yet gotten PFT's.   Patient notes difficulty swallowing for many years though has never had a GI evaluation. Notes having COVID-19 on two occasions, once in 2/2020 (onset of pandemic although not test confirmed), was very ill at the time, very short of breath and had high fever for 2 weeks. Notes significant decline in her breathing function since then. She used to walk up 20 steps with no difficulties prior to COVID, however now she needs wheelchair whenever she is not walking very short distances.   Patient has had Raynaud's for more than a decade, with reports of digital ulcerations in past. Per chart, had been on procardia and prazosin in past for Raynauds, however reports not taking any medication for Raynaud's at this time.  Regarding skin tightening, patient notes that her skin laxity is finally improving now. Skin tightening is limited to distal hands (fingers) but is looser in forearms and in face now.

## 2024-05-13 NOTE — REVIEW OF SYSTEMS
[SOB on Exertion] : shortness of breath during exertion [Joint Pain] : joint pain [As Noted in HPI] : as noted in HPI [Difficulty Walking] : difficulty walking [Negative] : Heme/Lymph [Fever] : no fever [Chills] : no chills [Nasal Discharge] : no nasal discharge [Sore Throat] : no sore throat [Chest Pain] : no chest pain [Palpitations] : no palpitations [Lower Ext Edema] : no lower extremity edema [FreeTextEntry9] : b/l ankle pain, lower back pain, nodules in PIP/DIP joints occasionally draining fluid [de-identified] : telangiectasias on face, small raised bumps on arms/legs (not evaluated yet), nodules/cysts in PIP/DIP joints occasionally draining fluid as above

## 2024-05-14 LAB
IF BLOCK AB SER QL: 1 AU/ML
ZINC SERPL-MCNC: 88 UG/DL

## 2024-05-14 RX ORDER — SEMAGLUTIDE 1.34 MG/ML
4 INJECTION, SOLUTION SUBCUTANEOUS
Qty: 3 | Refills: 1 | Status: ACTIVE | COMMUNITY
Start: 2024-05-14 | End: 1900-01-01

## 2024-05-15 LAB
VIT B1 SERPL-MCNC: 150.1 NMOL/L
VIT B2 SERPL-MCNC: 484 UG/L
VIT B6 SERPL-MCNC: 5.6 UG/L

## 2024-05-20 ENCOUNTER — APPOINTMENT (OUTPATIENT)
Dept: PULMONOLOGY | Facility: CLINIC | Age: 69
End: 2024-05-20
Payer: COMMERCIAL

## 2024-05-20 VITALS
SYSTOLIC BLOOD PRESSURE: 118 MMHG | HEIGHT: 61 IN | WEIGHT: 273 LBS | HEART RATE: 95 BPM | DIASTOLIC BLOOD PRESSURE: 79 MMHG | BODY MASS INDEX: 51.54 KG/M2 | OXYGEN SATURATION: 97 %

## 2024-05-20 PROCEDURE — 99204 OFFICE O/P NEW MOD 45 MIN: CPT

## 2024-05-20 RX ORDER — MONTELUKAST 10 MG/1
10 TABLET, FILM COATED ORAL
Qty: 90 | Refills: 1 | Status: ACTIVE | COMMUNITY
Start: 2024-05-20 | End: 1900-01-01

## 2024-05-20 RX ORDER — ALBUTEROL SULFATE 90 UG/1
108 (90 BASE) INHALANT RESPIRATORY (INHALATION)
Qty: 20.1 | Refills: 0 | Status: ACTIVE | COMMUNITY
Start: 2024-05-20 | End: 1900-01-01

## 2024-05-20 NOTE — PROCEDURE
[FreeTextEntry1] : Reviewed:  	 EXAM: [id number] - CT CHEST  - ORDERED BY: TEZ JOANNAZ   PROCEDURE DATE:  04/22/2024    INTERPRETATION:  EXAMINATION: CT CHEST  CLINICAL INDICATION: Scleroderma.  TECHNIQUE: Noncontrast CT of the chest was obtained.  COMPARISON: None.  FINDINGS:  AIRWAYS AND LUNGS: The central tracheobronchial tree is patent.  Peripheral reticulation and groundglass opacities. Bronchiectasis without honeycombing. 3 mm right lower lobe nodule.  MEDIASTINUM AND PLEURA: There are no enlarged mediastinal, hilar or axillary lymph nodes. The visualized portion of the thyroid gland is unremarkable. There is no pleural effusion. There is no pneumothorax.  HEART AND VESSELS: There is mild cardiomegaly.  There are atherosclerotic calcifications of the aorta and coronary arteries.  There is trace pericardial fluid.  UPPER ABDOMEN: Images of the upper abdomen demonstrate right hepatic cyst. Fluid distended esophagus  BONES AND SOFT TISSUES: The bones are unremarkable.  The soft tissues are unremarkable.  IMPRESSION: Interstitial lung disease with 3 mm right lower lobe nodule. Continued follow-up CT recommended  --- End of Report ---       CESAR SIN MD; Attending Radiologist This document has been electronically signed. Apr 25 2024 10:34PM

## 2024-05-20 NOTE — ASSESSMENT
[FreeTextEntry1] : it is possible the ILD on her ct is from original covid infection given its distribution (R>L) suggest she follow up for full PFT at minimum would need repeat ct chest 1 year, will see how PFT looks and see if she decides to start toci with rheum will refill singulair and prn albuterol for now  fu for pft  A total of 45 minutes was spent on this encounter including history taking, chart review, physical examination, testing interpretation and treatment plan.

## 2024-05-20 NOTE — HISTORY OF PRESENT ILLNESS
[Former] : former [TextBox_4] : 68F with scleroderma, reynauds, HTN, HLD, obesity, congenital adrenal hyperplasia  last seen in 2021 by me one time here to review abnormal chest ct findings has some ILD on recent ct chest of note had similar findings on ct chest in 2021 pt reports her breathing had been fine up until getting covid in 2020, breathing went downhill at that time.  She questions weather the scarring on her ct is from covid and not the scleroderma.   she is limited in walking mostly from pain, obesity no cough/wheeze has seasonal allergies and singulair has always helped with this, she requests a refill, also requests an inhaler to try she smoked briefly in her early 20's for about 2-3 yrs [TextBox_13] : 3 [YearQuit] : 1980

## 2024-05-22 ENCOUNTER — APPOINTMENT (OUTPATIENT)
Dept: ENDOCRINOLOGY | Facility: CLINIC | Age: 69
End: 2024-05-22
Payer: COMMERCIAL

## 2024-05-22 VITALS
BODY MASS INDEX: 51.16 KG/M2 | DIASTOLIC BLOOD PRESSURE: 77 MMHG | WEIGHT: 271 LBS | HEIGHT: 61 IN | SYSTOLIC BLOOD PRESSURE: 117 MMHG | HEART RATE: 78 BPM | OXYGEN SATURATION: 98 %

## 2024-05-22 DIAGNOSIS — E66.9 OBESITY, UNSPECIFIED: ICD-10-CM

## 2024-05-22 DIAGNOSIS — E11.65 TYPE 2 DIABETES MELLITUS WITH HYPERGLYCEMIA: ICD-10-CM

## 2024-05-22 DIAGNOSIS — E03.9 HYPOTHYROIDISM, UNSPECIFIED: ICD-10-CM

## 2024-05-22 DIAGNOSIS — E25.9 ADRENOGENITAL DISORDER, UNSPECIFIED: ICD-10-CM

## 2024-05-22 PROCEDURE — 99215 OFFICE O/P EST HI 40 MIN: CPT

## 2024-05-22 PROCEDURE — G2211 COMPLEX E/M VISIT ADD ON: CPT

## 2024-05-22 RX ORDER — LEVALBUTEROL TARTRATE 45 UG/1
45 AEROSOL, METERED ORAL
Qty: 30 | Refills: 2 | Status: ACTIVE | COMMUNITY
Start: 2024-05-22 | End: 1900-01-01

## 2024-05-23 ENCOUNTER — APPOINTMENT (OUTPATIENT)
Dept: PULMONOLOGY | Facility: CLINIC | Age: 69
End: 2024-05-23
Payer: COMMERCIAL

## 2024-05-23 ENCOUNTER — NON-APPOINTMENT (OUTPATIENT)
Age: 69
End: 2024-05-23

## 2024-05-23 PROBLEM — E25.9: Status: ACTIVE | Noted: 2021-04-08

## 2024-05-23 LAB — VIT C SERPL-MCNC: 0.1 MG/DL

## 2024-05-23 PROCEDURE — 94726 PLETHYSMOGRAPHY LUNG VOLUMES: CPT

## 2024-05-23 PROCEDURE — 94729 DIFFUSING CAPACITY: CPT

## 2024-05-23 PROCEDURE — 94010 BREATHING CAPACITY TEST: CPT

## 2024-05-23 NOTE — ASSESSMENT
[FreeTextEntry1] : 68 year old female with past medical history of Diabetes Mellitus Type 2, CAH on prednisone, Scleroderma, Hashimotos Hypothyroidism, Adrenal Nodules, Wheelchair bound with cane for short distances with ankle issues who presents for management  1. DM 2- controlled, uncomplicated Patient counseled on the importance of diabetic control and complications. Patient's diet and the necessary changes discussed. Reviewed over glycemic goals. Blood sugars improving  Cont Glimepiride 1 mg BID Continue Ozempic 1 mg, will increase to 2 mg once finished  If Mounjaro available then would prefer Mounjaro 10 mg or 12.5 mg  check fsg Cont diabetic diet Cont exercise Labs due Opthalmology Visit needs follow up Foot Exam up to date  2. HLD- stable Cont Rosuvastatin  3. Hypothyroidism Cont Levothyroxine 137 mcg QD Will check levels today  4. CAH Unclear if patient really has CAH. But cannot dx until she is switched to lower doses of hydrocortisone When attempted to switch to hydrocortisone in the past patient had difficulty breathing We will try again in the future Cont Prednisone

## 2024-05-23 NOTE — PHYSICAL EXAM
[Alert] : alert [Well Nourished] : well nourished [No Acute Distress] : no acute distress [Normal Voice/Communication] : normal voice communication [No Rash] : no rash [Oriented x3] : oriented to person, place, and time [de-identified] : wheelchair bound

## 2024-05-23 NOTE — HISTORY OF PRESENT ILLNESS
[FreeTextEntry1] : Ms. ASHLEY SANDOVAL  is a 68 year old female with past medical history of Diabetes Mellitus Type 2, CAH on prednisone, Scleroderma, Hashimoto's Hypothyroidism, Adrenal Nodules, Wheelchair bound with cane for short distances with ankle issues who presents for management.   Re Diabetes She has had difficulty in controlling her  blood sugars. Patient denies any history of diabetic retinopathy, nephropathy or neuropathy. He denies any blurry vision, polyuria, polydipsia and numbness/tingling in the extremities.   POCT Glucose: mg/dL POCT Hga1c:  %   Diabetes first diagnosed:years Type: 2  Current diabetic regimen: Glimepiride 1 mg BID Ozempic 1 mg Qweekly Mounjaro 12.5 mg Qweekly (stopped due to shortage)  Tradjenta 5 mg QD (stopped) Trulicity 3 mg Qweekly(stopped) Farxiga (yeast infection)  Other relevant medications:  Self monitoring blood glucose : 1x times per day:  SMBG: Pre-breakfast:100s Pre-lunch: Pre-dinner: bedtime:  Hypoglycemia:none  Diet:Tries to maintain  Exercise:Unable due to limites mobility  Urine micro: wnl (1/2023), trace prot (8/2021) lipid profile: (2/2023),  (10/2021),  (7/2021) last hba1c:6.2% (1/2024), 6.9% (4/2023), 7.6% (9/2022), 7.2% (6/2022), 8% (2/2022), 7.9% (11/2021), 8.5% (7/2021) eye exam:5/2022 diabetic foot exam/podiatry:in office 9/2021  Re CAH Patient was diagnosed while being managed by fertility specialists. She was placed on dexamethasone to help with fertility. She had 5 miscarriages. She was able to eventually able to have one child. She was always managed by fertility. She did see an endo 5 years ago but she did not have a formal evaluation for CAH. Her diabetes has been uncontrolled. She has gained a lot of weight. Attempt was made to switch from prednisone to hydrocortisone. Patient developed sob.  Re Thyroid She was diagnosed with hypothyroidism in her 20s and she has been on thyroid replacement. She takes Levothyroxine 150 mcg QD.

## 2024-05-29 LAB
ALBUMIN SERPL ELPH-MCNC: 4.2 G/DL
ALP BLD-CCNC: 107 U/L
ALT SERPL-CCNC: 13 U/L
ANION GAP SERPL CALC-SCNC: 16 MMOL/L
ANTI-BETA2 GLYCOPROTEIN 1 IGG CONCENTRATION: 1 U/ML
ANTI-BETA2 GLYCOPROTEIN 1 IGM CONCENTRATION: 8 U/ML
ANTI-CARDIOLIPIN IGG CONCENTRATION: 4 GPL
ANTI-CARDIOLIPIN IGM CONCENTRATION: <0.9 MPL
ANTI-CENP IGG CONCENTRATION: <0.4 U/ML
ANTI-CYCLIC CITRULLINATED PEPTIDE IGG CONCENTRATION: 1 U/ML
ANTI-DOUBLE-STRANDED DNA IGG - CRITHIDIA FLUORESCENCE: NEGATIVE
ANTI-DOUBLE-STRANDED DNA IGG CONCENTRATION: 331 IU/ML
ANTI-JO-1 IGG CONCENTRATION: <0.3 U/ML
ANTI-NUCLEAR ANTIBODIES - CYTOPLASMIC PATTERN: NORMAL
ANTI-NUCLEAR ANTIBODIES - PRIMARY NUCLEAR PATTERN: NORMAL
ANTI-NUCLEAR ANTIBODIES - PRIMARY PATTERN TITER: ABNORMAL
ANTI-NUCLEAR ANTIBODIES IGG CONCENTRATION: 32 UNITS
ANTI-RNA POL III IGG CONCENTRATION: 52 U/ML
ANTI-RNP70 IGG CONCENTRATION: 3 U/ML
ANTI-RO52 IGG CONCENTRATION: <0.3 U/ML
ANTI-RO60 IGG CONCENTRATION: <0.4 U/ML
ANTI-SCL-70 IGG CONCENTRATION: 1 U/ML
ANTI-SMITH IGG CONCENTRATION: <0.7 U/ML
ANTI-SS-B (LA) IGG CONCENTRATION: 0 U/ML
ANTI-THYROGLOBULIN IGG CONCENTRATION: ABNORMAL IU/ML
ANTI-THYROID PEROXIDASE IGG CONCENTRATION: 82 IU/ML
ANTI-U1RNP IGG CONCENTRATION: 2 U/ML
AST SERPL-CCNC: 14 U/L
AVISE LUPUS INDEX: -2
AVISE LUPUS RESULT: NEGATIVE
B-LYMPHOCYTE-BOUND C4D (BC4D) LEVEL: 13
BILIRUB SERPL-MCNC: 0.5 MG/DL
BUN SERPL-MCNC: 12 MG/DL
CALCIUM SERPL-MCNC: 9.6 MG/DL
CHLORIDE SERPL-SCNC: 105 MMOL/L
CHOLEST SERPL-MCNC: 214 MG/DL
CO2 SERPL-SCNC: 22 MMOL/L
CREAT SERPL-MCNC: 0.88 MG/DL
CREAT SPEC-SCNC: 41 MG/DL
EGFR: 72 ML/MIN/1.73M2
ERYTHROCYTE-BOUND C4D (EC4D) LEVEL: 1
ESTIMATED AVERAGE GLUCOSE: 126 MG/DL
FOLATE SERPL-MCNC: 10 NG/ML
GLUCOSE SERPL-MCNC: 107 MG/DL
HBA1C MFR BLD HPLC: 6 %
HCT VFR BLD CALC: 46.3 %
HDLC SERPL-MCNC: 42 MG/DL
HGB BLD-MCNC: 14.6 G/DL
LDLC SERPL CALC-MCNC: 148 MG/DL
MCHC RBC-ENTMCNC: 27 PG
MCHC RBC-ENTMCNC: 31.5 GM/DL
MCV RBC AUTO: 85.6 FL
MICROALBUMIN 24H UR DL<=1MG/L-MCNC: <1.2 MG/DL
MICROALBUMIN/CREAT 24H UR-RTO: NORMAL MG/G
NONHDLC SERPL-MCNC: 172 MG/DL
PLATELET # BLD AUTO: 426 K/UL
POTASSIUM SERPL-SCNC: 4.3 MMOL/L
PROT SERPL-MCNC: 7 G/DL
RBC # BLD: 5.41 M/UL
RBC # FLD: 16.4 %
RHEUMATOID FACTOR (IGA) CONCENTRATION: 6 IU/ML
RHEUMATOID FACTOR (IGM) CONCENTRATION: 2 IU/ML
SODIUM SERPL-SCNC: 142 MMOL/L
T3 SERPL-MCNC: 78 NG/DL
T4 FREE SERPL-MCNC: 1.6 NG/DL
TRIGL SERPL-MCNC: 129 MG/DL
TSH SERPL-ACNC: 0.43 UIU/ML
VIT B12 SERPL-MCNC: 609 PG/ML
WBC # FLD AUTO: 10.14 K/UL

## 2024-05-29 RX ORDER — TIRZEPATIDE 12.5 MG/.5ML
12.5 INJECTION, SOLUTION SUBCUTANEOUS
Qty: 3 | Refills: 2 | Status: ACTIVE | COMMUNITY
Start: 2022-09-26 | End: 1900-01-01

## 2024-05-30 ENCOUNTER — APPOINTMENT (OUTPATIENT)
Dept: PULMONOLOGY | Facility: CLINIC | Age: 69
End: 2024-05-30
Payer: COMMERCIAL

## 2024-05-30 ENCOUNTER — APPOINTMENT (OUTPATIENT)
Dept: RHEUMATOLOGY | Facility: CLINIC | Age: 69
End: 2024-05-30
Payer: COMMERCIAL

## 2024-05-30 DIAGNOSIS — R06.00 DYSPNEA, UNSPECIFIED: ICD-10-CM

## 2024-05-30 DIAGNOSIS — R93.89 ABNORMAL FINDINGS ON DIAGNOSTIC IMAGING OF OTHER SPECIFIED BODY STRUCTURES: ICD-10-CM

## 2024-05-30 PROCEDURE — G2211 COMPLEX E/M VISIT ADD ON: CPT

## 2024-05-30 PROCEDURE — 99213 OFFICE O/P EST LOW 20 MIN: CPT

## 2024-05-30 PROCEDURE — 99215 OFFICE O/P EST HI 40 MIN: CPT

## 2024-05-30 NOTE — HISTORY OF PRESENT ILLNESS
[FreeTextEntry1] : This visit was provided via telehealth using real-time 2-way audio-visual technology.    The patient was located at HOME in [place] at the time of the visit.   The provider was located at HOME in [place] at the time of the visit.   The patient and provider participated in the telehealth encounter.   Verbal consent for telehealth services was given by the patient.   Ms. Viveros is a non-smoking female with scleroderma (diagnosed 2001- sx of skin tightening in hands and around mouth, Raynauds, telangiectasias, reported calcinosis, dysphagia, and recent onset ILD), Hashimoto's thyroiditis, HTN, HLD, DM2, congenital adrenal hyperplasia (reported and in chart, on chronic steroids, currently on prednisone 5 mg QD), wheelchair bound recently due to dyspnea + b/l ankles deformity, here for follow-up of her scleroderma treatment.  INTERVAL Hx  in terms of endo  - started mounjouro  - the a1c improved   does not have diverticulosis and never had diverticulitis  in terms of breathing and lungs  - sometimes out of breath - breathing got worse off the steroids   in terms of joints  - hands and wrists swollen and painful  - the joitns in the ankle - collapsed - xrays werent done yet - making appt this week - she prefers not to do the MRI - but is afraid of it  - went to Dr. Spicer and [facility] - was considering MTX for a while but never got to it.   - problems with the breathing started after COVID - had a very bad case of it  - would rather try something less intense than the actemra - but understands that the breathing may have been more related to the COVID as not progressing  --------------------------------------------------------  BACKGROUND INFORMATION Previously was seeing Dr. Spicer- as mentioned above was on prednisone 5 mg QD (though more for adrenal issues than for scleroderma) and diclofenac 75 mg BID for years (though takes it once daily at this time). Other doctors (endocrine) have tried to taper this steroid dosage (pt recalls being switched to hydrocortisone recently, however was unable to wean steroids due to her breathing getting worse with lower dose of steroids).  Pt had a CT chest 3/2021 showing mild ILD in lung bases- has not repeated since then. Has not yet gotten PFT's.   Patient notes difficulty swallowing for many years though has never had a GI evaluation. Notes having COVID-19 on two occasions, once in 2/2020 (onset of pandemic although not test confirmed), was very ill at the time, very short of breath and had high fever for 2 weeks. Notes significant decline in her breathing function since then. She used to walk up 20 steps with no difficulties prior to COVID, however now she needs wheelchair whenever she is not walking very short distances.   Patient has had Raynaud's for more than a decade, with reports of digital ulcerations in past. Per chart, had been on procardia and prazosin in past for Raynauds, however reports not taking any medication for Raynaud's at this time.  Regarding skin tightening, patient notes that her skin laxity is finally improving now. Skin tightening is limited to distal hands (fingers) but is looser in forearms and in face now.  [___ Month(s) Ago] : [unfilled] month(s) ago

## 2024-05-30 NOTE — REVIEW OF SYSTEMS
[Fever] : no fever [Chills] : no chills [Nasal Discharge] : no nasal discharge [Sore Throat] : no sore throat [Chest Pain] : no chest pain [Palpitations] : no palpitations [Lower Ext Edema] : no lower extremity edema [SOB on Exertion] : shortness of breath during exertion [Joint Pain] : joint pain [As Noted in HPI] : as noted in HPI [Difficulty Walking] : difficulty walking [Negative] : Heme/Lymph [FreeTextEntry9] : b/l ankle pain, lower back pain, nodules in PIP/DIP joints occasionally draining fluid [de-identified] : telangiectasias on face, small raised bumps on arms/legs (not evaluated yet), nodules/cysts in PIP/DIP joints occasionally draining fluid as above

## 2024-05-30 NOTE — PROCEDURE
[FreeTextEntry1] : PFT: Normal spirometry.  Lung volumes normal. DLCO normal.   Reviewed:  	 EXAM: [id number] - CT CHEST  - ORDERED BY: TEZ THURSTON   PROCEDURE DATE:  04/22/2024    INTERPRETATION:  EXAMINATION: CT CHEST  CLINICAL INDICATION: Scleroderma.  TECHNIQUE: Noncontrast CT of the chest was obtained.  COMPARISON: None.  FINDINGS:  AIRWAYS AND LUNGS: The central tracheobronchial tree is patent.  Peripheral reticulation and groundglass opacities. Bronchiectasis without honeycombing. 3 mm right lower lobe nodule.  MEDIASTINUM AND PLEURA: There are no enlarged mediastinal, hilar or axillary lymph nodes. The visualized portion of the thyroid gland is unremarkable. There is no pleural effusion. There is no pneumothorax.  HEART AND VESSELS: There is mild cardiomegaly.  There are atherosclerotic calcifications of the aorta and coronary arteries.  There is trace pericardial fluid.  UPPER ABDOMEN: Images of the upper abdomen demonstrate right hepatic cyst. Fluid distended esophagus  BONES AND SOFT TISSUES: The bones are unremarkable.  The soft tissues are unremarkable.  IMPRESSION: Interstitial lung disease with 3 mm right lower lobe nodule. Continued follow-up CT recommended  --- End of Report ---       CESAR SIN MD; Attending Radiologist This document has been electronically signed. Apr 25 2024 10:34PM

## 2024-05-30 NOTE — ASSESSMENT
[FreeTextEntry1] :   Ms. Viveros is a non-smoking female with longstanding scleroderma (dx 2001), with skin tightening, Raynauds, dysphagia, and mild ILD (from CT chest 3/2021), here for follow-up of her scleroderma management.  # polyarthralgias/ polyarthritis -- d/w patient at length - very limiting  -- unclear if wrist arthritis is related to scleroderma or overlap process as it appears a lot like RA -- given Scl/ILD and now with arthritis - would recommended Tocilizumab (Actemra) Injections for the patient  -- x-rays ordered for the patient -- increased inflammatory markers x years - ESR a bit better -- on voltaren now - takes it dailly - cannot come off it becasue of extreme pain and dysfunction -- check serologies and sub-serologies -- check activity monitoring labs -- goal of the DMARD would be too come off the NSAIDd  -- has been on steroids since age 25 - started 2/2 infertilitiy.  had 5 miscarriages and took 13 years until she had her son.   has struggled to get off it all these years.   understands may not be able to get completely off 2/2 adrenal suppression.   -- could also consider HCQ, MMF, AZA - if the lung disease isnt related to the scleroderma  #Scleroderma dx 2001 with skin tightening, RP, dysphagia a mild ILD.  Now also with GERD, telangiectasias and what appears to be calcinosis - extensive. RNA Polymerase III positive  -- QUINTEN possitive with dsDNA positive - but crithida negative -- in terms of lungs:   evaluate extent of ILD and it's progression.  d/w patient tocilizumab and r/b/a discussed.  f/u pulmonary, PFT, walking time, CT chest -- in terms of high risk for pHTN - rec echo and cardio  -- in terms of GERD - continue GI evaluation which has most recently disclosed an enlarged esophagus and is c/w her condition -- in terms of RP - controlled at present - protection for now - though depending on progression may benefit from medical intervention eg ccb - in terms of calcinosis - referral for the patient to go see Dr. Morris Muniz M.D. for the calcinosis  #medical mgmt, long term use of drug  -- d/w patient at length long terms steroids - would rec taper as tolerated and guided by endo -- possible new medication start - tocilizumab - r/b/a discussed. This can be given either as a monthly infusion or as a once every 2 week home injection.  I discussed the potential side effects of injection site reaction (erythema at the site which generally resolves as injections continue) or infusion reaction (for which pre-medications, tylenol and benadryl, are given to help prevent this possible side effect).  We reviewed the potential for  immune suppression and a possible increased risk of infection - Since tuberculosis infection can become severe in patients on biologic mediations, all patients must have a negative PPD or Quantiferon test prior to beginning therapy with any of these medications.  A yearly PPD or Quantiferon must also be performed. Cell counts, liver function, and kidney function will be monitored routinely for screening for any toxicity.  Patients on tocilizumab may rarely develop elevated cholesterol levels which will also be monitored.  -- While on immunosuppressive medication,  no live-virus vaccines should be given.      Today's medical care services serve as the continuing focal point for needed health care services that are part of ongoing care related to a patient's single, serious condition or a complex condition.  More than 50% of the encounter was spent counseling the patient on differential, workup, disease course and treatment/management.  Education was provided to the patient during this encounter.  All questions and concerns were addressed and answered.   The patient verbalized understanding and agreed to the plan.   Patient has been instructed to call for an appointment if new symptoms develop. Patient has been instructed to make a follow-up appointment in 3 weeks.   Time spent on the encounter included, but is not limited to, preparing to see the patient, obtaining and/or reviewing separately obtained history, performing the evaluation, counseling and educating, independently interpreting results with communication to patient, order placement, referring and/or communicating with other health professionals as described, and documenting clinical information in the electronic health record.

## 2024-05-30 NOTE — REASON FOR VISIT
[Home] : at home, [unfilled] , at the time of the visit. [Medical Office: ([facility])___] : at the medical office located in  [Patient] : the patient

## 2024-05-30 NOTE — ASSESSMENT
[FreeTextEntry1] : breathing looks fine i suspect maybe the scarring is 2/2 to prior covid infection rather than rheum issues fu 6 mo fu with rheum

## 2024-06-10 NOTE — PHYSICAL EXAM
[General Appearance - In No Acute Distress] : in no acute distress [General Appearance - Alert] : alert [Auscultation Breath Sounds / Voice Sounds] : lungs were clear to auscultation bilaterally [Abnormal Walk] : normal gait [Nail Clubbing] : no clubbing  or cyanosis of the fingernails [Musculoskeletal - Swelling] : no joint swelling seen [Motor Tone] : muscle strength and tone were normal [Skin Color & Pigmentation] : normal skin color and pigmentation [Skin Turgor] : normal skin turgor [] : no rash [Oriented To Time, Place, And Person] : oriented to person, place, and time [Impaired Insight] : insight and judgment were intact [Affect] : the affect was normal

## 2024-06-11 ENCOUNTER — APPOINTMENT (OUTPATIENT)
Dept: RADIOLOGY | Facility: CLINIC | Age: 69
End: 2024-06-11

## 2024-06-11 PROCEDURE — 73660 X-RAY EXAM OF TOE(S): CPT | Mod: 50

## 2024-06-11 PROCEDURE — 73120 X-RAY EXAM OF HAND: CPT | Mod: 50

## 2024-06-11 PROCEDURE — 73100 X-RAY EXAM OF WRIST: CPT | Mod: 50

## 2024-06-11 PROCEDURE — 73564 X-RAY EXAM KNEE 4 OR MORE: CPT | Mod: 50

## 2024-06-12 ENCOUNTER — APPOINTMENT (OUTPATIENT)
Dept: RHEUMATOLOGY | Facility: CLINIC | Age: 69
End: 2024-06-12

## 2024-06-12 ENCOUNTER — APPOINTMENT (OUTPATIENT)
Dept: RHEUMATOLOGY | Facility: CLINIC | Age: 69
End: 2024-06-12
Payer: COMMERCIAL

## 2024-06-12 VITALS
OXYGEN SATURATION: 97 % | SYSTOLIC BLOOD PRESSURE: 114 MMHG | BODY MASS INDEX: 53.24 KG/M2 | DIASTOLIC BLOOD PRESSURE: 84 MMHG | WEIGHT: 282 LBS | HEIGHT: 61 IN | HEART RATE: 98 BPM

## 2024-06-12 DIAGNOSIS — J84.9 INTERSTITIAL PULMONARY DISEASE, UNSPECIFIED: ICD-10-CM

## 2024-06-12 DIAGNOSIS — Z79.899 OTHER LONG TERM (CURRENT) DRUG THERAPY: ICD-10-CM

## 2024-06-12 DIAGNOSIS — M34.9 SYSTEMIC SCLEROSIS, UNSPECIFIED: ICD-10-CM

## 2024-06-12 DIAGNOSIS — M13.0 POLYARTHRITIS, UNSPECIFIED: ICD-10-CM

## 2024-06-12 DIAGNOSIS — M25.50 PAIN IN UNSPECIFIED JOINT: ICD-10-CM

## 2024-06-12 LAB
CRP SERPL-MCNC: 19 MG/L
ERYTHROCYTE [SEDIMENTATION RATE] IN BLOOD BY WESTERGREN METHOD: 16 MM/HR

## 2024-06-12 PROCEDURE — G2211 COMPLEX E/M VISIT ADD ON: CPT

## 2024-06-12 PROCEDURE — 99215 OFFICE O/P EST HI 40 MIN: CPT

## 2024-06-14 ENCOUNTER — RX RENEWAL (OUTPATIENT)
Age: 69
End: 2024-06-14

## 2024-06-14 PROBLEM — M34.9 SCLERODERMA: Status: ACTIVE | Noted: 2021-04-08

## 2024-06-14 PROBLEM — Z79.899 NEW MEDICATION ADDED: Status: ACTIVE | Noted: 2024-05-30

## 2024-06-14 PROBLEM — J84.9 ILD (INTERSTITIAL LUNG DISEASE): Status: ACTIVE | Noted: 2021-12-13

## 2024-06-14 PROBLEM — M13.0 POLYARTICULAR ARTHRITIS: Status: ACTIVE | Noted: 2024-06-14

## 2024-06-14 PROBLEM — M25.50 POLYARTHRALGIA: Status: ACTIVE | Noted: 2024-05-09

## 2024-06-14 LAB
ALBUMIN MFR SERPL ELPH: 53.6 %
ALBUMIN SERPL-MCNC: 3.9 G/DL
ALBUMIN/GLOB SERPL: 1.1 RATIO
ALPHA1 GLOB MFR SERPL ELPH: 5 %
ALPHA1 GLOB SERPL ELPH-MCNC: 0.4 G/DL
ALPHA2 GLOB MFR SERPL ELPH: 13.2 %
ALPHA2 GLOB SERPL ELPH-MCNC: 1 G/DL
B-GLOBULIN MFR SERPL ELPH: 12.8 %
B-GLOBULIN SERPL ELPH-MCNC: 0.9 G/DL
DEPRECATED KAPPA LC FREE/LAMBDA SER: 1.5 RATIO
GAMMA GLOB FLD ELPH-MCNC: 1.1 G/DL
GAMMA GLOB MFR SERPL ELPH: 15.4 %
HAV IGM SER QL: NONREACTIVE
HBV CORE IGG+IGM SER QL: NONREACTIVE
HBV CORE IGM SER QL: NONREACTIVE
HBV SURFACE AG SER QL: NONREACTIVE
HCV AB SER QL: NONREACTIVE
HCV S/CO RATIO: 0.06 S/CO
IGA SER QL IEP: 350 MG/DL
IGG SER QL IEP: 1018 MG/DL
IGM SER QL IEP: 59 MG/DL
INTERPRETATION SERPL IEP-IMP: NORMAL
KAPPA LC CSF-MCNC: 2.26 MG/DL
KAPPA LC SERPL-MCNC: 3.39 MG/DL
M PROTEIN SPEC IFE-MCNC: NORMAL
M TB IFN-G BLD-IMP: NEGATIVE
PROT SERPL-MCNC: 7.3 G/DL
PROT SERPL-MCNC: 7.3 G/DL
QUANTIFERON TB PLUS MITOGEN MINUS NIL: 0.7 IU/ML
QUANTIFERON TB PLUS NIL: 0.03 IU/ML
QUANTIFERON TB PLUS TB1 MINUS NIL: 0 IU/ML
QUANTIFERON TB PLUS TB2 MINUS NIL: 0 IU/ML

## 2024-06-14 RX ORDER — FOLIC ACID 1 MG/1
1 TABLET ORAL
Qty: 90 | Refills: 3 | Status: ACTIVE | COMMUNITY
Start: 2024-06-14 | End: 1900-01-01

## 2024-06-14 RX ORDER — METHOTREXATE 2.5 MG/1
2.5 TABLET ORAL
Qty: 38 | Refills: 0 | Status: ACTIVE | COMMUNITY
Start: 2024-06-14 | End: 1900-01-01

## 2024-06-14 NOTE — HISTORY OF PRESENT ILLNESS
[___ Week(s) Ago] : [unfilled] week(s) ago [FreeTextEntry1] : Ms. Viveros is a non-smoking female with scleroderma (diagnosed 2001- sx of skin tightening in hands and around mouth, Raynaud's, telangiectasias, reported calcinosis, dysphagia, and recent onset ILD), Hashimoto's thyroiditis, HTN, HLD, DM2, congenital adrenal hyperplasia (reported and in chart, on chronic steroids, currently on prednisone 5 mg QD), wheelchair bound recently due to dyspnea + bilateral ankles deformity, here for follow-up of her scleroderma treatment.  INTERVAL HISTORY  multiple medical issues and complaints  - the patient states her breathing test came back great and she has no issues - she states she did still ask her doctor for an inhaler in case of an emergency since she is still having trouble breathing - the patient is still ambulating with the use of a cane and was using a wheelchair in the office today - the patient states she doesn't drink unless it is a small amount for a special occasion like New Years. She states she is allergic to wine. - she states that for the last 2 weeks she has been unable to move her left arm. She states she is unable to even lift up a cup of coffee. - she states the pain starts in the shoulder and shoots down the arm up until it gets to the elbow - she states the pain was so bad she had to use a heating pad - Tylenol has not been helping to mitigate the pain in her arm - She states she has been using the can in the right hand so now her right wrist hurts because of the pain from weightbearing - she states she is back on the Mounjaro 12.5 MG after searching for a while and being off of it for so long. she states the other doses have not been working - she states she does not each very large portions of food - the patient is unable to walk on her right ankle without the risk of losing the foot. she states the podiatrist told her she is not a candidate for surgery due to her scleroderma. she is able to walk short distances from her car into the lobby of the office - the patient states she has an appointment tomorrow to get fitted for a wheelchair she states she needs a manual one for her family to be able to bring her to appointments as well as a motorized one so she can get herself around since she is unable to push herself in a manual one due to her wrist pain - she states she is going to make an appointment to see the eye doctor - she states she is going to go back to the cardiologist - she states she had her bloodwork done yesterday on 06/11/2024 - she states she is unable to hold her phone anymore because of the pain in her hands and fingers  --------------------------------------------------------  BACKGROUND INFORMATION Previously was seeing Dr. Spicer- as mentioned above was on prednisone 5 mg QD (though more for adrenal issues than for scleroderma) and diclofenac 75 mg BID for years (though takes it once daily at this time). Other doctors (endocrine) have tried to taper this steroid dosage (pt recalls being switched to hydrocortisone recently, however, was unable to wean steroids due to her breathing getting worse with lower dose of steroids).  Pt had a CT chest 3/2021 showing mild ILD in lung bases- has not repeated since then. Has not yet gotten PFT's.   Patient notes difficulty swallowing for many years though has never had a GI evaluation. Notes having COVID-19 on two occasions, once in 2/2020 (onset of pandemic although not test confirmed), was very ill at the time, very short of breath and had high fever for 2 weeks. Notes significant decline in her breathing function since then. She used to walk up 20 steps with no difficulties prior to COVID, however now she needs wheelchair whenever she is not walking very short distances.   Patient has had Raynaud's for more than a decade, with reports of digital ulcerations in past. Per chart, had been on Procardia and prazosin in past for Raynaud's, however, reports not taking any medication for Raynaud's at this time.  Regarding skin tightening, patient notes that her skin laxity is finally improving now. Skin tightening is limited to distal hands (fingers) but is looser in forearms and in face now.

## 2024-06-14 NOTE — REVIEW OF SYSTEMS
[SOB on Exertion] : shortness of breath during exertion [Joint Pain] : joint pain [As Noted in HPI] : as noted in HPI [Difficulty Walking] : difficulty walking [Negative] : Heme/Lymph [Fever] : no fever [Chills] : no chills [Nasal Discharge] : no nasal discharge [Sore Throat] : no sore throat [Chest Pain] : no chest pain [Palpitations] : no palpitations [Lower Ext Edema] : no lower extremity edema [FreeTextEntry9] : b/l ankle pain, lower back pain, nodules in PIP/DIP joints occasionally draining fluid [de-identified] : telangiectasias on face, small raised bumps on arms/legs (not evaluated yet), nodules/cysts in PIP/DIP joints occasionally draining fluid as above (2) very limited

## 2024-06-14 NOTE — ASSESSMENT
[FreeTextEntry1] : Ms. Viveros is a non-smoking female with longstanding scleroderma (dx 2001), with skin tightening, Raynaud's, dysphagia, and mild ILD (from CT chest 3/2021), here for follow-up of her scleroderma management.  # ankle pain/ deformity - continue with the Voltaren for now - recommended ortho and surgical intervention options for her right ankle  # polyarthralgia's/ polyarthritis -- unclear if wrist arthritis is related to scleroderma or overlap process as it appears a lot more like RA -- x-rays ordered for the patient and pending results -- increased inflammatory markers x years  increased CRP now normalized ESR though has been elevated  -- on voltaren now - takes it daily - cannot come off it because of extreme pain and dysfunction -- serologies and sub-serologies negative RA  -- check activity monitoring labs -- goal of the DMARD would be too come off the NSAIDd  -- could also consider HCQ, MMF, AZA - if the lung disease isn't related to the scleroderma - goal is to get the patient off of the Aspirin? -- d/w patient options for the joint pain and deformities.   given that the pulmonary features are not likely 2/2 scleroderma and more related to covid - and her high risk of infections - would start with either MTX/ HCQ.  r.b.a of toci/MTX/HCQ/MMF discussed and patient opts to start MTX now.  was previously told by another rheumatolgoist about this medication too but never got around to starting it so would liek to start there - sent a prescription to the patient's pharmacy for folic acid (take daily) - send to the mail order - sent a prescription to the patient's pharmacy to start her on the Methotrexate (take 3 tablets once a day at night) - send the [facility] in [place]  #Scleroderma dx 2001 with skin tightening, RP, dysphagia a mild ILD.  Now also with GERD, telangiectasias and what appears to be calcinosis - extensive. RNA Polymerase III positive  -- QUINTEN positive with dsDNA positive - but crithida negative -- in terms of lungs:   evaluate extent of ILD and its progression.  d/w patient tocilizumab and r/b/a discussed.  f/u pulmonary, PFT, walking time, CT chest -- in terms of high risk for pHTN - rec echo and cardio  -- in terms of GERD - continue GI evaluation which has most recently disclosed an enlarged esophagus and is c/w her condition -- in terms of RP - controlled at present - protection for now - though depending on progression may benefit from medical intervention eg ccb - in terms of calcinosis - referral for the patient to go see Dr. Morris Muniz M.D. for the calcinosis  #medical mgmt, long term use of drug  -- d/w patient at length long terms steroids - would rec taper as tolerated and guided by endo  -- MTX start - r/b/a discussed.   Reviewed the risk including, but not limited to LFT abnormalities, bone marrow suppression, pulmonary hypersensitivity, GI upset, infection, warning for pregnancy, effect on fertility, avoidance of administration of live vaccines and alopecia.  Patient advised on ETOH cessation.  .mtx  Today's medical care services serve as the continuing focal point for needed health care services that are part of ongoing care related to a patient's single, serious condition or a complex condition.  More than 50% of the encounter was spent counseling the patient on differential, workup, disease course and treatment/management.  Education was provided to the patient during this encounter.  All questions and concerns were addressed and answered.   The patient verbalized understanding and agreed to the plan.   Patient has been instructed to call for an appointment if new symptoms develop. Patient has been instructed to make a follow-up appointment in 3-4 weeks.   Time spent on the encounter included, but is not limited to, preparing to see the patient, obtaining and/or reviewing separately obtained history, performing the evaluation, counseling and educating, independently interpreting results with communication to patient, order placement, referring and/or communicating with other health professionals as described, and documenting clinical information in the electronic health record.

## 2024-06-16 LAB — G6PD SER-CCNC: 14.6 U/G HGB

## 2024-06-17 LAB
TPMT ENZYME INTERPRETATION: NORMAL
TPMT ENZYME METHODOLOGY: NORMAL
TPMT ENZYME: 21.6

## 2024-06-18 LAB — IL6 SERPL-MCNC: 3.3 PG/ML

## 2024-07-09 ENCOUNTER — APPOINTMENT (OUTPATIENT)
Dept: RHEUMATOLOGY | Facility: CLINIC | Age: 69
End: 2024-07-09
Payer: COMMERCIAL

## 2024-07-09 VITALS
HEIGHT: 61 IN | OXYGEN SATURATION: 96 % | BODY MASS INDEX: 51.54 KG/M2 | SYSTOLIC BLOOD PRESSURE: 123 MMHG | WEIGHT: 273 LBS | DIASTOLIC BLOOD PRESSURE: 76 MMHG | HEART RATE: 92 BPM

## 2024-07-09 DIAGNOSIS — M25.512 PAIN IN LEFT SHOULDER: ICD-10-CM

## 2024-07-09 DIAGNOSIS — G89.29 PAIN IN LEFT SHOULDER: ICD-10-CM

## 2024-07-09 DIAGNOSIS — M62.830 MUSCLE SPASM OF BACK: ICD-10-CM

## 2024-07-09 DIAGNOSIS — Z79.899 OTHER LONG TERM (CURRENT) DRUG THERAPY: ICD-10-CM

## 2024-07-09 DIAGNOSIS — Z51.81 ENCOUNTER FOR THERAPEUTIC DRUG LVL MONITORING: ICD-10-CM

## 2024-07-09 DIAGNOSIS — M13.0 POLYARTHRITIS, UNSPECIFIED: ICD-10-CM

## 2024-07-09 DIAGNOSIS — Z79.631 LONG TERM (CURRENT) USE OF ANTIMETABOLITE AGENT: ICD-10-CM

## 2024-07-09 PROCEDURE — G2211 COMPLEX E/M VISIT ADD ON: CPT

## 2024-07-09 PROCEDURE — 20610 DRAIN/INJ JOINT/BURSA W/O US: CPT | Mod: LT

## 2024-07-09 PROCEDURE — 99214 OFFICE O/P EST MOD 30 MIN: CPT | Mod: 25

## 2024-07-09 RX ORDER — CYCLOBENZAPRINE HYDROCHLORIDE 10 MG/1
10 TABLET, FILM COATED ORAL
Qty: 10 | Refills: 0 | Status: ACTIVE | COMMUNITY
Start: 2024-07-09 | End: 1900-01-01

## 2024-07-09 RX ADMIN — LIDOCAINE HYDROCHLORIDE %: 10 INJECTION, SOLUTION EPIDURAL; INFILTRATION; INTRACAUDAL; PERINEURAL at 00:00

## 2024-07-09 RX ADMIN — METHYLPREDNISOLONE ACETATE MG/ML: 40 INJECTION, SUSPENSION INTRA-ARTICULAR; INTRALESIONAL; INTRAMUSCULAR; SOFT TISSUE at 00:00

## 2024-07-11 PROBLEM — M25.512 CHRONIC LEFT SHOULDER PAIN: Status: ACTIVE | Noted: 2024-07-11

## 2024-07-11 PROBLEM — Z79.899 LONG TERM CURRENT USE OF IMMUNOSUPPRESSIVE DRUG: Status: ACTIVE | Noted: 2024-07-11

## 2024-07-11 PROBLEM — M62.830 PARASPINAL MUSCLE SPASM: Status: ACTIVE | Noted: 2024-07-09

## 2024-07-11 LAB
ALBUMIN SERPL ELPH-MCNC: 4.1 G/DL
ALP BLD-CCNC: 101 U/L
ALT SERPL-CCNC: 19 U/L
ANION GAP SERPL CALC-SCNC: 15 MMOL/L
AST SERPL-CCNC: 20 U/L
BILIRUB SERPL-MCNC: 0.4 MG/DL
BUN SERPL-MCNC: 13 MG/DL
CALCIUM SERPL-MCNC: 9.4 MG/DL
CHLORIDE SERPL-SCNC: 103 MMOL/L
CO2 SERPL-SCNC: 21 MMOL/L
CREAT SERPL-MCNC: 0.74 MG/DL
CRP SERPL-MCNC: 16 MG/L
EGFR: 89 ML/MIN/1.73M2
HCT VFR BLD CALC: 46.8 %
MCHC RBC-ENTMCNC: 27 PG
MCHC RBC-ENTMCNC: 30.1 GM/DL
MCV RBC AUTO: 89.5 FL
PLATELET # BLD AUTO: 378 K/UL
POTASSIUM SERPL-SCNC: 4.2 MMOL/L
PROT SERPL-MCNC: 6.9 G/DL
RBC # BLD: 5.23 M/UL
RBC # FLD: 18 %
SODIUM SERPL-SCNC: 139 MMOL/L
WBC # FLD AUTO: 10.9 K/UL

## 2024-07-11 RX ORDER — LIDOCAINE HYDROCHLORIDE 10 MG/ML
1 INJECTION, SOLUTION INFILTRATION; PERINEURAL
Refills: 0 | Status: COMPLETED | OUTPATIENT
Start: 2024-07-09 | End: 2024-07-09

## 2024-07-11 RX ORDER — METHYLPRED ACET/NACL,ISO-OS/PF 40 MG/ML
40 VIAL (ML) INJECTION
Refills: 0 | Status: COMPLETED | OUTPATIENT
Start: 2024-07-09 | End: 2024-07-09

## 2024-07-16 ENCOUNTER — APPOINTMENT (OUTPATIENT)
Dept: INTERNAL MEDICINE | Facility: CLINIC | Age: 69
End: 2024-07-16
Payer: COMMERCIAL

## 2024-07-16 VITALS — DIASTOLIC BLOOD PRESSURE: 76 MMHG | SYSTOLIC BLOOD PRESSURE: 117 MMHG

## 2024-07-16 VITALS
OXYGEN SATURATION: 98 % | WEIGHT: 266 LBS | RESPIRATION RATE: 12 BRPM | DIASTOLIC BLOOD PRESSURE: 94 MMHG | HEIGHT: 61 IN | SYSTOLIC BLOOD PRESSURE: 150 MMHG | BODY MASS INDEX: 50.22 KG/M2 | HEART RATE: 100 BPM

## 2024-07-16 DIAGNOSIS — J84.9 INTERSTITIAL PULMONARY DISEASE, UNSPECIFIED: ICD-10-CM

## 2024-07-16 DIAGNOSIS — M34.9 SYSTEMIC SCLEROSIS, UNSPECIFIED: ICD-10-CM

## 2024-07-16 DIAGNOSIS — I10 ESSENTIAL (PRIMARY) HYPERTENSION: ICD-10-CM

## 2024-07-16 DIAGNOSIS — E78.5 HYPERLIPIDEMIA, UNSPECIFIED: ICD-10-CM

## 2024-07-16 PROCEDURE — 99213 OFFICE O/P EST LOW 20 MIN: CPT

## 2024-07-16 PROCEDURE — G2211 COMPLEX E/M VISIT ADD ON: CPT

## 2024-07-24 DIAGNOSIS — M25.50 PAIN IN UNSPECIFIED JOINT: ICD-10-CM

## 2024-07-24 DIAGNOSIS — R20.2 PARESTHESIA OF SKIN: ICD-10-CM

## 2024-07-24 RX ORDER — DULOXETINE HYDROCHLORIDE 30 MG/1
30 CAPSULE, DELAYED RELEASE PELLETS ORAL
Qty: 90 | Refills: 0 | Status: ACTIVE | COMMUNITY
Start: 2024-07-24 | End: 1900-01-01

## 2024-08-14 ENCOUNTER — APPOINTMENT (OUTPATIENT)
Dept: RHEUMATOLOGY | Facility: CLINIC | Age: 69
End: 2024-08-14
Payer: COMMERCIAL

## 2024-08-14 VITALS
DIASTOLIC BLOOD PRESSURE: 64 MMHG | HEART RATE: 93 BPM | OXYGEN SATURATION: 98 % | WEIGHT: 266.38 LBS | HEIGHT: 61 IN | BODY MASS INDEX: 50.29 KG/M2 | SYSTOLIC BLOOD PRESSURE: 116 MMHG

## 2024-08-14 DIAGNOSIS — M34.9 SYSTEMIC SCLEROSIS, UNSPECIFIED: ICD-10-CM

## 2024-08-14 DIAGNOSIS — G89.29 PAIN IN LEFT SHOULDER: ICD-10-CM

## 2024-08-14 DIAGNOSIS — M25.50 PAIN IN UNSPECIFIED JOINT: ICD-10-CM

## 2024-08-14 DIAGNOSIS — M62.830 MUSCLE SPASM OF BACK: ICD-10-CM

## 2024-08-14 DIAGNOSIS — Z79.631 LONG TERM (CURRENT) USE OF ANTIMETABOLITE AGENT: ICD-10-CM

## 2024-08-14 DIAGNOSIS — M13.0 POLYARTHRITIS, UNSPECIFIED: ICD-10-CM

## 2024-08-14 DIAGNOSIS — Z79.899 OTHER LONG TERM (CURRENT) DRUG THERAPY: ICD-10-CM

## 2024-08-14 DIAGNOSIS — Z13.820 ENCOUNTER FOR SCREENING FOR OSTEOPOROSIS: ICD-10-CM

## 2024-08-14 DIAGNOSIS — M25.531 PAIN IN RIGHT WRIST: ICD-10-CM

## 2024-08-14 DIAGNOSIS — Z51.81 ENCOUNTER FOR THERAPEUTIC DRUG LVL MONITORING: ICD-10-CM

## 2024-08-14 DIAGNOSIS — M25.512 PAIN IN LEFT SHOULDER: ICD-10-CM

## 2024-08-14 DIAGNOSIS — E55.9 VITAMIN D DEFICIENCY, UNSPECIFIED: ICD-10-CM

## 2024-08-14 PROCEDURE — G2211 COMPLEX E/M VISIT ADD ON: CPT

## 2024-08-14 PROCEDURE — 99215 OFFICE O/P EST HI 40 MIN: CPT

## 2024-08-17 ENCOUNTER — RX RENEWAL (OUTPATIENT)
Age: 69
End: 2024-08-17

## 2024-08-17 PROBLEM — Z13.820 OSTEOPOROSIS SCREENING: Status: RESOLVED | Noted: 2022-09-26 | Resolved: 2024-08-17

## 2024-08-17 PROBLEM — M62.830 PARASPINAL MUSCLE SPASM: Status: RESOLVED | Noted: 2024-07-09 | Resolved: 2024-08-17

## 2024-08-17 PROBLEM — Z79.899 NEW MEDICATION ADDED: Status: RESOLVED | Noted: 2024-05-30 | Resolved: 2024-08-17

## 2024-08-17 LAB
25(OH)D3 SERPL-MCNC: 28 NG/ML
ALBUMIN SERPL ELPH-MCNC: 3.9 G/DL
ALP BLD-CCNC: 102 U/L
ALT SERPL-CCNC: 13 U/L
ANION GAP SERPL CALC-SCNC: 10 MMOL/L
AST SERPL-CCNC: 15 U/L
BILIRUB SERPL-MCNC: 0.4 MG/DL
BUN SERPL-MCNC: 11 MG/DL
CALCIUM SERPL-MCNC: 9.3 MG/DL
CHLORIDE SERPL-SCNC: 107 MMOL/L
CO2 SERPL-SCNC: 24 MMOL/L
CREAT SERPL-MCNC: 0.83 MG/DL
CRP SERPL-MCNC: 15 MG/L
EGFR: 78 ML/MIN/1.73M2
ERYTHROCYTE [SEDIMENTATION RATE] IN BLOOD BY WESTERGREN METHOD: 14 MM/HR
FOLATE SERPL-MCNC: 10.4 NG/ML
HCT VFR BLD CALC: 47 %
HGB BLD-MCNC: 14 G/DL
MCHC RBC-ENTMCNC: 27.6 PG
MCHC RBC-ENTMCNC: 29.8 GM/DL
MCV RBC AUTO: 92.5 FL
PLATELET # BLD AUTO: 385 K/UL
POTASSIUM SERPL-SCNC: 4.6 MMOL/L
PROT SERPL-MCNC: 6.6 G/DL
RBC # BLD: 5.08 M/UL
RBC # FLD: 19.5 %
SODIUM SERPL-SCNC: 142 MMOL/L
VIT B12 SERPL-MCNC: 505 PG/ML
WBC # FLD AUTO: 10.84 K/UL

## 2024-08-17 NOTE — PHYSICAL EXAM
[General Appearance - Alert] : alert [General Appearance - In No Acute Distress] : in no acute distress [Auscultation Breath Sounds / Voice Sounds] : lungs were clear to auscultation bilaterally [Abnormal Walk] : normal gait [Nail Clubbing] : no clubbing  or cyanosis of the fingernails [Musculoskeletal - Swelling] : no joint swelling seen [Motor Tone] : muscle strength and tone were normal [Skin Color & Pigmentation] : normal skin color and pigmentation [Skin Turgor] : normal skin turgor [] : no rash [Oriented To Time, Place, And Person] : oriented to person, place, and time [Impaired Insight] : insight and judgment were intact [Affect] : the affect was normal [FreeTextEntry1] : severe pain with AROM of the left shoulder -

## 2024-08-17 NOTE — PHYSICAL EXAM
[General Appearance - Alert] : alert [General Appearance - In No Acute Distress] : in no acute distress [Auscultation Breath Sounds / Voice Sounds] : lungs were clear to auscultation bilaterally [Abnormal Walk] : normal gait [Nail Clubbing] : no clubbing  or cyanosis of the fingernails [Musculoskeletal - Swelling] : no joint swelling seen [Motor Tone] : muscle strength and tone were normal [Skin Color & Pigmentation] : normal skin color and pigmentation [Skin Turgor] : normal skin turgor [] : no rash [Oriented To Time, Place, And Person] : oriented to person, place, and time [Affect] : the affect was normal [Impaired Insight] : insight and judgment were intact [FreeTextEntry1] : severe pain with AROM of the left shoulder -

## 2024-08-17 NOTE — ASSESSMENT
[FreeTextEntry1] : Ms. Viveros is a non-smoking female with longstanding scleroderma (dx 2001), with skin tightening, Raynaud's, dysphagia, and mild ILD (from CT chest 3/2021), here for follow-up of her scleroderma management.  complex patient  multiple morbidities  active pain and dysfunction high risk US left shoulder and weakness   # shoulder pain  exam with pain and limitation of function at the elft shoudler - but also with extension to the paraspinal muscles on the left that were TTP  - prescribed the patient with a muscle relaxant r/b/a reviewed - shoulder injection not helpful - has soem weakness also -- declines MRI to assess for tear  -- will do an US of the shoulder - order written - routine bloodwork  # ankle pain/ deformity - continue with the Voltaren for now - recommended ortho and surgical intervention options for her right ankle -- chornic pain and OA -> duloxetine started as well  # polyarthralgia's/ polyarthritis -- unclear if wrist arthritis is related to scleroderma or overlap process as it appears a lot more like RA -- x-rays ordered for the patient and pending results -- increased inflammatory markers x years  increased CRP now normalized ESR though has been elevated  -- on voltaren now - takes it daily - cannot come off it because of extreme pain and dysfunction -- serologies and sub-serologies negative RA  -- check activity monitoring labs -- goal of the DMARD would be too come off the NSAIDd  -- incrase MTX to 8 tabs weekly  #Scleroderma dx 2001 with skin tightening, RP, dysphagia a mild ILD.  Now also with GERD, telangiectasias and what appears to be calcinosis - extensive. RNA Polymerase III positive  -- QUINTEN positive with dsDNA positive - but crithida negative -- in terms of lungs:   evaluate extent of ILD and its progression.  d/w patient tocilizumab and r/b/a discussed.  f/u pulmonary, PFT, walking time, CT chest -- in terms of high risk for pHTN - rec echo and cardio  -- in terms of GERD - continue GI evaluation which has most recently disclosed an enlarged esophagus and is c/w her condition -- in terms of RP - controlled at present - protection for now - though depending on progression may benefit from medical intervention eg ccb - in terms of calcinosis - referral for the patient to go see Dr. Morris Muniz M.D. for the calcinosis  #medical mgmt, long term use of drug  -- check labs    Today's medical care services serve as the continuing focal point for needed health care services that are part of ongoing care related to a patient's single, serious condition or a complex condition.  More than 50% of the encounter was spent counseling the patient on differential, workup, disease course and treatment/management.  Education was provided to the patient during this encounter.  All questions and concerns were addressed and answered.   The patient verbalized understanding and agreed to the plan.   Patient has been instructed to call for an appointment if new symptoms develop. Patient has been instructed to make a follow-up appointment in 3-4 weeks.   Time spent on the encounter included, but is not limited to, preparing to see the patient, obtaining and/or reviewing separately obtained history, performing the evaluation, counseling and educating, independently interpreting results with communication to patient, order placement, referring and/or communicating with other health professionals as described, and documenting clinical information in the electronic health record.

## 2024-08-17 NOTE — REVIEW OF SYSTEMS
[SOB on Exertion] : shortness of breath during exertion [Joint Pain] : joint pain [As Noted in HPI] : as noted in HPI [Difficulty Walking] : difficulty walking [Negative] : Heme/Lymph [Fever] : no fever [Chills] : no chills [Nasal Discharge] : no nasal discharge [Sore Throat] : no sore throat [Chest Pain] : no chest pain [Palpitations] : no palpitations [Lower Ext Edema] : no lower extremity edema [de-identified] : telangiectasias on face, small raised bumps on arms/legs (not evaluated yet), nodules/cysts in PIP/DIP joints occasionally draining fluid as above [FreeTextEntry9] : b/l ankle pain, lower back pain, nodules in PIP/DIP joints occasionally draining fluid

## 2024-08-17 NOTE — REVIEW OF SYSTEMS
[SOB on Exertion] : shortness of breath during exertion [Joint Pain] : joint pain [As Noted in HPI] : as noted in HPI [Difficulty Walking] : difficulty walking [Negative] : Heme/Lymph [Fever] : no fever [Chills] : no chills [Nasal Discharge] : no nasal discharge [Sore Throat] : no sore throat [Chest Pain] : no chest pain [Palpitations] : no palpitations [Lower Ext Edema] : no lower extremity edema [de-identified] : telangiectasias on face, small raised bumps on arms/legs (not evaluated yet), nodules/cysts in PIP/DIP joints occasionally draining fluid as above [FreeTextEntry9] : b/l ankle pain, lower back pain, nodules in PIP/DIP joints occasionally draining fluid

## 2024-08-17 NOTE — HISTORY OF PRESENT ILLNESS
[___ Week(s) Ago] : [unfilled] week(s) ago [FreeTextEntry1] : Ms. Viveros is a non-smoking female with scleroderma (diagnosed 2001- sx of skin tightening in hands and around mouth, Raynaud's, telangiectasias, reported calcinosis, dysphagia, and recent onset ILD), Hashimoto's thyroiditis, HTN, HLD, DM2, congenital adrenal hyperplasia (reported and in chart, on chronic steroids, currently on prednisone 5 mg QD), wheelchair bound recently due to dyspnea + bilateral ankles deformity, here for follow-up of her scleroderma treatment.  INTERVAL HISTORY  in terms of the scleroderma  - stable and no complaints   in terms of shoulder pain  - injection didnt help  - has limitation and severe pain  - endorses weakness as well - doesnt like MRI  in terms of hte wrists/hands  - started the MTX and tolerating it thus fare - wrists and hands aren't as bad  in terms of medcations  - started the mtx - and tolerating it well now  --------------------------------------------------------  BACKGROUND INFORMATION Previously was seeing Dr. Spicer- as mentioned above was on prednisone 5 mg QD (though more for adrenal issues than for scleroderma) and diclofenac 75 mg BID for years (though takes it once daily at this time). Other doctors (endocrine) have tried to taper this steroid dosage (pt recalls being switched to hydrocortisone recently, however, was unable to wean steroids due to her breathing getting worse with lower dose of steroids).  Pt had a CT chest 3/2021 showing mild ILD in lung bases- has not repeated since then. Has not yet gotten PFT's.   Patient notes difficulty swallowing for many years though has never had a GI evaluation. Notes having COVID-19 on two occasions, once in 2/2020 (onset of pandemic although not test confirmed), was very ill at the time, very short of breath and had high fever for 2 weeks. Notes significant decline in her breathing function since then. She used to walk up 20 steps with no difficulties prior to COVID, however now she needs wheelchair whenever she is not walking very short distances.   Patient has had Raynaud's for more than a decade, with reports of digital ulcerations in past. Per chart, had been on Procardia and prazosin in past for Raynaud's, however, reports not taking any medication for Raynaud's at this time.  Regarding skin tightening, patient notes that her skin laxity is finally improving now. Skin tightening is limited to distal hands (fingers) but is looser in forearms and in face now.

## 2024-08-22 LAB
HOMOCYSTEINE LEVEL: 14.7 UMOL/L
METHYLMALONATE SERPL-SCNC: 179 NMOL/L

## 2024-09-03 ENCOUNTER — NON-APPOINTMENT (OUTPATIENT)
Age: 69
End: 2024-09-03

## 2024-09-04 DIAGNOSIS — M34.9 SYSTEMIC SCLEROSIS, UNSPECIFIED: ICD-10-CM

## 2024-09-04 DIAGNOSIS — Z79.631 LONG TERM (CURRENT) USE OF ANTIMETABOLITE AGENT: ICD-10-CM

## 2024-09-04 DIAGNOSIS — Z79.899 OTHER LONG TERM (CURRENT) DRUG THERAPY: ICD-10-CM

## 2024-09-04 DIAGNOSIS — Z51.81 ENCOUNTER FOR THERAPEUTIC DRUG LVL MONITORING: ICD-10-CM

## 2024-09-09 ENCOUNTER — APPOINTMENT (OUTPATIENT)
Dept: ENDOCRINOLOGY | Facility: CLINIC | Age: 69
End: 2024-09-09
Payer: COMMERCIAL

## 2024-09-09 VITALS
SYSTOLIC BLOOD PRESSURE: 138 MMHG | OXYGEN SATURATION: 98 % | DIASTOLIC BLOOD PRESSURE: 78 MMHG | HEIGHT: 61 IN | WEIGHT: 265 LBS | BODY MASS INDEX: 50.03 KG/M2 | HEART RATE: 88 BPM

## 2024-09-09 DIAGNOSIS — E78.5 HYPERLIPIDEMIA, UNSPECIFIED: ICD-10-CM

## 2024-09-09 DIAGNOSIS — E25.9 ADRENOGENITAL DISORDER, UNSPECIFIED: ICD-10-CM

## 2024-09-09 DIAGNOSIS — E11.65 TYPE 2 DIABETES MELLITUS WITH HYPERGLYCEMIA: ICD-10-CM

## 2024-09-09 DIAGNOSIS — E03.9 HYPOTHYROIDISM, UNSPECIFIED: ICD-10-CM

## 2024-09-09 LAB — HBA1C MFR BLD HPLC: 5.3

## 2024-09-09 PROCEDURE — 83036 HEMOGLOBIN GLYCOSYLATED A1C: CPT | Mod: QW

## 2024-09-09 PROCEDURE — 99214 OFFICE O/P EST MOD 30 MIN: CPT

## 2024-09-09 NOTE — END OF VISIT
[Time Spent: ___ minutes] : I have spent [unfilled] minutes of time on the encounter which excludes teaching and separately reported services. negative - no pain, no limited range of motion

## 2024-09-10 NOTE — PHYSICAL EXAM
[Alert] : alert [Well Nourished] : well nourished [No Acute Distress] : no acute distress [Normal Voice/Communication] : normal voice communication [No Rash] : no rash [Oriented x3] : oriented to person, place, and time [de-identified] : wheelchair bound

## 2024-09-10 NOTE — PHYSICAL EXAM
[Alert] : alert [Well Nourished] : well nourished [No Acute Distress] : no acute distress [Normal Voice/Communication] : normal voice communication [No Rash] : no rash [Oriented x3] : oriented to person, place, and time [de-identified] : wheelchair bound

## 2024-09-10 NOTE — HISTORY OF PRESENT ILLNESS
[FreeTextEntry1] : Ms. ASHLEY SANDOVAL  is a 68 year old female with past medical history of Diabetes Mellitus Type 2, CAH on prednisone, Scleroderma, Hashimoto's Hypothyroidism, Adrenal Nodules, Wheelchair bound with cane for short distances with ankle issues who presents for management.   Re Diabetes She has had difficulty in controlling her  blood sugars. Patient denies any history of diabetic retinopathy, nephropathy or neuropathy. He denies any blurry vision, polyuria, polydipsia and numbness/tingling in the extremities.   POCT Glucose: mg/dL POCT Hga1c:  %   Diabetes first diagnosed:years Type: 2  Current diabetic regimen: Glimepiride 1 mg BID Mounjaro 12.5 mg Qweekly   Tradjenta 5 mg QD (stopped) Trulicity 3 mg Qweekly(stopped) Farxiga (yeast infection)  Other relevant medications:  Self monitoring blood glucose : 1x times per day:  SMBG: Pre-breakfast:100s Pre-lunch: Pre-dinner: bedtime:  Hypoglycemia:none  Diet:Tries to maintain  Exercise:Unable due to limites mobility  Urine micro: wnl (1/2023), trace prot (8/2021) lipid profile: (2/2023),  (10/2021),  (7/2021) last hba1c:6.2% (1/2024), 6.9% (4/2023), 7.6% (9/2022), 7.2% (6/2022), 8% (2/2022), 7.9% (11/2021), 8.5% (7/2021) eye exam:5/2022 diabetic foot exam/podiatry:in office 9/2021  Re CAH Patient was diagnosed while being managed by fertility specialists. She was placed on dexamethasone to help with fertility. She had 5 miscarriages. She was able to eventually able to have one child. She was always managed by fertility. She did see an endo 5 years ago but she did not have a formal evaluation for CAH. Her diabetes has been uncontrolled. She has gained a lot of weight. Attempt was made to switch from prednisone to hydrocortisone. Patient developed sob.  Re Thyroid She was diagnosed with hypothyroidism in her 20s and she has been on thyroid replacement. She takes Levothyroxine 150 mcg QD.

## 2024-09-10 NOTE — ASSESSMENT
[FreeTextEntry1] : 68 year old female with past medical history of Diabetes Mellitus Type 2, CAH on prednisone, Scleroderma, Hashimotos Hypothyroidism, Adrenal Nodules, Wheelchair bound with cane for short distances with ankle issues who presents for management  1. DM 2- controlled, uncomplicated Patient counseled on the importance of diabetic control and complications. Patient's diet and the necessary changes discussed. Reviewed over glycemic goals. Blood sugars improving  Stop Glimepiride 1 mg BID Inc Mounjaro 15 mg Qweekly check fsg Cont diabetic diet Cont exercise Labs due Opthalmology Visit needs follow up Foot Exam up to date  2. HLD- stable Cont Rosuvastatin  3. Hypothyroidism Cont Levothyroxine 137 mcg QD Will check levels today  4. CAH Unclear if patient really has CAH. But cannot dx until she is switched to lower doses of hydrocortisone When attempted to switch to hydrocortisone in the past patient had difficulty breathing We will try again in the future Cont Prednisone

## 2024-09-11 ENCOUNTER — LABORATORY RESULT (OUTPATIENT)
Age: 69
End: 2024-09-11

## 2024-09-11 DIAGNOSIS — R30.0 DYSURIA: ICD-10-CM

## 2024-09-11 LAB
ALBUMIN SERPL ELPH-MCNC: 4.1 G/DL
ALP BLD-CCNC: 110 U/L
ALT SERPL-CCNC: 16 U/L
ANION GAP SERPL CALC-SCNC: 14 MMOL/L
AST SERPL-CCNC: 17 U/L
BASOPHILS # BLD AUTO: 0.09 K/UL
BASOPHILS NFR BLD AUTO: 0.7 %
BILIRUB SERPL-MCNC: 0.4 MG/DL
BUN SERPL-MCNC: 14 MG/DL
CALCIUM SERPL-MCNC: 10 MG/DL
CHLORIDE SERPL-SCNC: 105 MMOL/L
CO2 SERPL-SCNC: 21 MMOL/L
CREAT SERPL-MCNC: 0.93 MG/DL
CRP SERPL-MCNC: 21 MG/L
EGFR: 68 ML/MIN/1.73M2
EOSINOPHIL # BLD AUTO: 0.15 K/UL
EOSINOPHIL NFR BLD AUTO: 1.2 %
ERYTHROCYTE [SEDIMENTATION RATE] IN BLOOD BY WESTERGREN METHOD: 16 MM/HR
HCT VFR BLD CALC: 49.4 %
HGB BLD-MCNC: 14.6 G/DL
IMM GRANULOCYTES NFR BLD AUTO: 0.9 %
LYMPHOCYTES # BLD AUTO: 1.68 K/UL
LYMPHOCYTES NFR BLD AUTO: 13.3 %
MAN DIFF?: NORMAL
MCHC RBC-ENTMCNC: 28 PG
MCHC RBC-ENTMCNC: 29.6 GM/DL
MCV RBC AUTO: 94.8 FL
MONOCYTES # BLD AUTO: 1.09 K/UL
MONOCYTES NFR BLD AUTO: 8.6 %
NEUTROPHILS # BLD AUTO: 9.53 K/UL
NEUTROPHILS NFR BLD AUTO: 75.3 %
PLATELET # BLD AUTO: 387 K/UL
POTASSIUM SERPL-SCNC: 4.5 MMOL/L
PROT SERPL-MCNC: 7.1 G/DL
RBC # BLD: 5.21 M/UL
RBC # FLD: 19.9 %
SODIUM SERPL-SCNC: 141 MMOL/L
WBC # FLD AUTO: 12.66 K/UL

## 2024-09-13 ENCOUNTER — NON-APPOINTMENT (OUTPATIENT)
Age: 69
End: 2024-09-13

## 2024-09-13 DIAGNOSIS — N39.0 URINARY TRACT INFECTION, SITE NOT SPECIFIED: ICD-10-CM

## 2024-09-13 LAB
APPEARANCE: CLEAR
BACTERIA UR CULT: NORMAL
BILIRUBIN URINE: NEGATIVE
BLOOD URINE: ABNORMAL
COLOR: YELLOW
GLUCOSE QUALITATIVE U: NEGATIVE MG/DL
KETONES URINE: NEGATIVE MG/DL
LEUKOCYTE ESTERASE URINE: ABNORMAL
NITRITE URINE: POSITIVE
PH URINE: 6
PROTEIN URINE: NEGATIVE MG/DL
SPECIFIC GRAVITY URINE: 1.01
UROBILINOGEN URINE: 0.2 MG/DL

## 2024-09-13 RX ORDER — NITROFURANTOIN (MONOHYDRATE/MACROCRYSTALS) 25; 75 MG/1; MG/1
100 CAPSULE ORAL TWICE DAILY
Qty: 10 | Refills: 0 | Status: ACTIVE | COMMUNITY
Start: 2024-09-13 | End: 1900-01-01

## 2024-09-16 ENCOUNTER — NON-APPOINTMENT (OUTPATIENT)
Age: 69
End: 2024-09-16

## 2024-09-17 ENCOUNTER — APPOINTMENT (OUTPATIENT)
Dept: RHEUMATOLOGY | Facility: CLINIC | Age: 69
End: 2024-09-17
Payer: COMMERCIAL

## 2024-09-17 DIAGNOSIS — Z51.81 ENCOUNTER FOR THERAPEUTIC DRUG LVL MONITORING: ICD-10-CM

## 2024-09-17 DIAGNOSIS — M34.9 SYSTEMIC SCLEROSIS, UNSPECIFIED: ICD-10-CM

## 2024-09-17 DIAGNOSIS — Z76.89 PERSONS ENCOUNTERING HEALTH SERVICES IN OTHER SPECIFIED CIRCUMSTANCES: ICD-10-CM

## 2024-09-17 DIAGNOSIS — Z79.899 OTHER LONG TERM (CURRENT) DRUG THERAPY: ICD-10-CM

## 2024-09-17 DIAGNOSIS — H04.123 DRY MOUTH, UNSPECIFIED: ICD-10-CM

## 2024-09-17 DIAGNOSIS — R19.7 DIARRHEA, UNSPECIFIED: ICD-10-CM

## 2024-09-17 DIAGNOSIS — R68.2 DRY MOUTH, UNSPECIFIED: ICD-10-CM

## 2024-09-17 PROCEDURE — G2211 COMPLEX E/M VISIT ADD ON: CPT | Mod: NC

## 2024-09-17 PROCEDURE — 99214 OFFICE O/P EST MOD 30 MIN: CPT

## 2024-09-17 RX ORDER — HYDROXYCHLOROQUINE SULFATE 200 MG/1
200 TABLET, FILM COATED ORAL DAILY
Qty: 60 | Refills: 0 | Status: ACTIVE | COMMUNITY
Start: 2024-09-17 | End: 1900-01-01

## 2024-09-17 RX ORDER — ERGOCALCIFEROL 1.25 MG/1
1.25 MG CAPSULE ORAL
Qty: 12 | Refills: 0 | Status: ACTIVE | COMMUNITY
Start: 2024-09-17 | End: 1900-01-01

## 2024-09-18 ENCOUNTER — RX RENEWAL (OUTPATIENT)
Age: 69
End: 2024-09-18

## 2024-09-19 LAB
ALBUMIN SERPL ELPH-MCNC: 4 G/DL
ALP BLD-CCNC: 97 U/L
ALT SERPL-CCNC: 18 U/L
ANION GAP SERPL CALC-SCNC: 12 MMOL/L
AST SERPL-CCNC: 17 U/L
BASOPHILS # BLD AUTO: 0.09 K/UL
BASOPHILS NFR BLD AUTO: 0.8 %
BILIRUB SERPL-MCNC: 0.3 MG/DL
BUN SERPL-MCNC: 10 MG/DL
CALCIUM SERPL-MCNC: 9.6 MG/DL
CHLORIDE SERPL-SCNC: 108 MMOL/L
CHOLEST SERPL-MCNC: 195 MG/DL
CO2 SERPL-SCNC: 22 MMOL/L
CREAT SERPL-MCNC: 0.75 MG/DL
CREAT SPEC-SCNC: 32 MG/DL
CRP SERPL-MCNC: 25 MG/L
EGFR: 88 ML/MIN/1.73M2
EOSINOPHIL # BLD AUTO: 0.11 K/UL
EOSINOPHIL NFR BLD AUTO: 1 %
ERYTHROCYTE [SEDIMENTATION RATE] IN BLOOD BY WESTERGREN METHOD: 31 MM/HR
FRUCTOSAMINE SERPL-MCNC: 219 UMOL/L
GLUCOSE SERPL-MCNC: 87 MG/DL
HCT VFR BLD CALC: 45.3 %
HDLC SERPL-MCNC: 47 MG/DL
HGB BLD-MCNC: 14 G/DL
IMM GRANULOCYTES NFR BLD AUTO: 1 %
LDLC SERPL CALC-MCNC: 123 MG/DL
LYMPHOCYTES # BLD AUTO: 1.56 K/UL
LYMPHOCYTES NFR BLD AUTO: 14.3 %
MAN DIFF?: NORMAL
MCHC RBC-ENTMCNC: 28.4 PG
MCHC RBC-ENTMCNC: 30.9 GM/DL
MCV RBC AUTO: 91.9 FL
MICROALBUMIN 24H UR DL<=1MG/L-MCNC: 4.2 MG/DL
MICROALBUMIN/CREAT 24H UR-RTO: 132 MG/G
MONOCYTES # BLD AUTO: 1.11 K/UL
MONOCYTES NFR BLD AUTO: 10.2 %
NEUTROPHILS # BLD AUTO: 7.93 K/UL
NEUTROPHILS NFR BLD AUTO: 72.7 %
NONHDLC SERPL-MCNC: 148 MG/DL
PLATELET # BLD AUTO: 372 K/UL
POTASSIUM SERPL-SCNC: 4.1 MMOL/L
PROT SERPL-MCNC: 6.7 G/DL
RBC # BLD: 4.93 M/UL
RBC # FLD: 18.8 %
SODIUM SERPL-SCNC: 141 MMOL/L
T4 FREE SERPL-MCNC: 1.5 NG/DL
TRIGL SERPL-MCNC: 141 MG/DL
TSH SERPL-ACNC: 0.51 UIU/ML
WBC # FLD AUTO: 10.91 K/UL

## 2024-09-20 LAB
ENA SS-A AB SER IA-ACNC: <0.2 AL
ENA SS-B AB SER IA-ACNC: <0.2 AL

## 2024-09-21 PROBLEM — Z79.899 NEW MEDICATION ADDED: Status: ACTIVE | Noted: 2024-09-21

## 2024-09-21 PROBLEM — Z76.89 ENCOUNTER FOR NEW MEDICATION PRESCRIPTION: Status: ACTIVE | Noted: 2024-09-21

## 2024-09-21 NOTE — ASSESSMENT
[FreeTextEntry1] : Ms. Viveros is a non-smoking female with longstanding scleroderma (dx 2001), with skin tightening, Raynaud's, dysphagia, and mild ILD (from CT chest 3/2021), here for follow-up of her scleroderma management.  complex patient  multiple morbidities  active pain and dysfunction high risk  # diarrhea  patient attributes the diarrhea to increased MTX dose - though unclear -- off MTX for now improving but not resolved diarhea. -- discussed that if the diarreha was 2/2 MTX - it woudl have resolved by now - however patietn remains concerned about it and will not want to restart it --Has not scheduled GI f/u  # ankle pain/ deformity -- continue with the Voltaren for now -- recommended ortho and surgical intervention options for her right ankle -- chronic pain and OA -> duloxetine started as well  # polyarthralgia's/ polyarthritis -- unclear if wrist arthritis is related to scleroderma or overlap process as it appears a lot more like RA -- x-rays ordered for the patient and pending results - see data reviewed for full results. 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 Significant OA of knees and feet, w/o erosions unclear if secondary to inflammatory process -- increased inflammatory markers x years increased CRP now normalized ESR though has been elevated  -- on voltaren now - takes it daily - cannot come off it because of extreme pain and dysfunction -- serologies and sub-serologies negative RA  -- check activity monitoring labs -- goal of the DMARD would be too come off the NSAID -- Not willing to consider retrial of MTX  #Scleroderma dx 2001 with skin tightening, RP, dysphagia a mild ILD.  Now also with GERD, telangiectasias and what appears to be calcinosis - extensive. RNA Polymerase III positive  -- QUINTEN positive with dsDNA positive - but crithida negative -- in terms of lungs:   evaluate extent of ILD and its progression.  d/w patient tocilizumab and r/b/a discussed.  f/u pulmonary, PFT, walking time, CT chest -- in terms of high risk for pHTN - rec echo and cardio  -- in terms of GERD - continue GI evaluation which has most recently disclosed an enlarged esophagus and is c/w her condition -- in terms of RP - controlled at present - protection for now - though depending on progression may benefit from medical intervention eg ccb - in terms of calcinosis - referral for the patient to go see Dr. Morris Muniz M.D. for the calcinosis  #New medication  -- Start HCQ 400mg daily. R/b/a reviewed --Will need to review recommendation for at least annual assessment ophtho --G6PD nl 06/2024 --RTC 1 month for tolerability visit  Today's medical care services serve as the continuing focal point for needed health care services that are part of ongoing care related to a patient's single, serious condition or a complex condition.  More than 50% of the encounter was spent counseling the patient on differential, workup, disease course and treatment/management.  Education was provided to the patient during this encounter.  All questions and concerns were addressed and answered.   The patient verbalized understanding and agreed to the plan.   Patient has been instructed to call for an appointment if new symptoms develop. Patient has been instructed to make a follow-up appointment in 3-4 weeks.   Time spent on the encounter included, but is not limited to, preparing to see the patient, obtaining and/or reviewing separately obtained history, performing the evaluation, counseling and educating, independently interpreting results with communication to patient, order placement, referring and/or communicating with other health professionals as described, and documenting clinical information in the electronic health record.   This patient was evaluated by Dr. Cosme DNP in collaboration with Dr. Dorys MD

## 2024-09-21 NOTE — HISTORY OF PRESENT ILLNESS
[___ Week(s) Ago] : [unfilled] week(s) ago [FreeTextEntry1] : Ms. Viveros is a non-smoking female with scleroderma (diagnosed 2001- sx of skin tightening in hands and around mouth, Raynaud's, telangiectasias, reported calcinosis, dysphagia, and recent onset ILD), Hashimoto's thyroiditis, HTN, HLD, DM2, congenital adrenal hyperplasia (reported and in chart, on chronic steroids, currently on prednisone 5 mg QD), wheelchair bound recently due to dyspnea + bilateral ankles deformity, here for follow-up of her scleroderma treatment.  INTERVAL Hx  -Pt stopped MTX about 1.5weeks ago, and reports still has ongoing diarrhea needs imodium but improving. Also reports increased hair shedding -Rx macrobid for UTI sx, neg culture but positive UA 09/2024 , moderate bacteria. Will complete abx Friday morning -Patient attributes sx to combination Voltaren and MTX -10/2025 for ophtho eval, willing to consider HCQ - G6PD nl 06/2024 -Vitamin D 28 start 43161L once weekly  Labs 09/11/24: WBC 12, CRP 21, +UA, neg/nl CMP,   in terms of the scleroderma  - stable and no complaints   --------------------------------------------------------  BACKGROUND INFORMATION Previously was seeing Dr. Spicer- as mentioned above was on prednisone 5 mg QD (though more for adrenal issues than for scleroderma) and diclofenac 75 mg BID for years (though takes it once daily at this time). Other doctors (endocrine) have tried to taper this steroid dosage (pt recalls being switched to hydrocortisone recently, however, was unable to wean steroids due to her breathing getting worse with lower dose of steroids).  Pt had a CT chest 3/2021 showing mild ILD in lung bases- has not repeated since then. Has not yet gotten PFT's.   Patient notes difficulty swallowing for many years though has never had a GI evaluation. Notes having COVID-19 on two occasions, once in 2/2020 (onset of pandemic although not test confirmed), was very ill at the time, very short of breath and had high fever for 2 weeks. Notes significant decline in her breathing function since then. She used to walk up 20 steps with no difficulties prior to COVID, however now she needs wheelchair whenever she is not walking very short distances.   Patient has had Raynaud's for more than a decade, with reports of digital ulcerations in past. Per chart, had been on Procardia and prazosin in past for Raynaud's, however, reports not taking any medication for Raynaud's at this time.  Regarding skin tightening, patient notes that her skin laxity is finally improving now. Skin tightening is limited to distal hands (fingers) but is looser in forearms and in face now.

## 2024-09-21 NOTE — DATA REVIEWED
[FreeTextEntry1] : XR 06/2024: Hands/Wrists: Bilateral advanced pan carpal arthrosis with advanced radiocarpal joint space narrowing. Question of resection of the proximal carpal rows. There are multifocal rounded mineralized deposits about the middle/distal second third and fifth digits of the right hand, which can be seen in the setting of scleroderma. Knees: Mild to moderate tricompartmental arthrosis of the right knee. Mild to moderate medial and severe lateral and patellofemoral arthrosis of the left knee. Small left knee effusion. Feet: Mild to moderate left and severe right hindfoot valgus with uncovering of the tarsal head with arthrosis at the talonavicular articulation. Bilateral pes planus. Dystrophic calcifications within the soft tissues about the calves.

## 2024-09-21 NOTE — HISTORY OF PRESENT ILLNESS
[___ Week(s) Ago] : [unfilled] week(s) ago [FreeTextEntry1] : Ms. Viveros is a non-smoking female with scleroderma (diagnosed 2001- sx of skin tightening in hands and around mouth, Raynaud's, telangiectasias, reported calcinosis, dysphagia, and recent onset ILD), Hashimoto's thyroiditis, HTN, HLD, DM2, congenital adrenal hyperplasia (reported and in chart, on chronic steroids, currently on prednisone 5 mg QD), wheelchair bound recently due to dyspnea + bilateral ankles deformity, here for follow-up of her scleroderma treatment.  INTERVAL Hx  -Pt stopped MTX about 1.5weeks ago, and reports still has ongoing diarrhea needs imodium but improving. Also reports increased hair shedding -Rx macrobid for UTI sx, neg culture but positive UA 09/2024 , moderate bacteria. Will complete abx Friday morning -Patient attributes sx to combination Voltaren and MTX -10/2025 for ophtho eval, willing to consider HCQ - G6PD nl 06/2024 -Vitamin D 28 start 90096A once weekly  Labs 09/11/24: WBC 12, CRP 21, +UA, neg/nl CMP,   in terms of the scleroderma  - stable and no complaints   --------------------------------------------------------  BACKGROUND INFORMATION Previously was seeing Dr. Spicer- as mentioned above was on prednisone 5 mg QD (though more for adrenal issues than for scleroderma) and diclofenac 75 mg BID for years (though takes it once daily at this time). Other doctors (endocrine) have tried to taper this steroid dosage (pt recalls being switched to hydrocortisone recently, however, was unable to wean steroids due to her breathing getting worse with lower dose of steroids).  Pt had a CT chest 3/2021 showing mild ILD in lung bases- has not repeated since then. Has not yet gotten PFT's.   Patient notes difficulty swallowing for many years though has never had a GI evaluation. Notes having COVID-19 on two occasions, once in 2/2020 (onset of pandemic although not test confirmed), was very ill at the time, very short of breath and had high fever for 2 weeks. Notes significant decline in her breathing function since then. She used to walk up 20 steps with no difficulties prior to COVID, however now she needs wheelchair whenever she is not walking very short distances.   Patient has had Raynaud's for more than a decade, with reports of digital ulcerations in past. Per chart, had been on Procardia and prazosin in past for Raynaud's, however, reports not taking any medication for Raynaud's at this time.  Regarding skin tightening, patient notes that her skin laxity is finally improving now. Skin tightening is limited to distal hands (fingers) but is looser in forearms and in face now.

## 2024-09-21 NOTE — PHYSICAL EXAM
[General Appearance - Alert] : alert [General Appearance - In No Acute Distress] : in no acute distress [Auscultation Breath Sounds / Voice Sounds] : lungs were clear to auscultation bilaterally [Abnormal Walk] : normal gait [Musculoskeletal - Swelling] : no joint swelling seen [Nail Clubbing] : no clubbing  or cyanosis of the fingernails [Motor Tone] : muscle strength and tone were normal [Skin Turgor] : normal skin turgor [Skin Color & Pigmentation] : normal skin color and pigmentation [] : no rash [Oriented To Time, Place, And Person] : oriented to person, place, and time [Impaired Insight] : insight and judgment were intact [Affect] : the affect was normal [FreeTextEntry1] : severe pain with AROM of the left shoulder -  ,[email or web address]

## 2024-09-21 NOTE — PHYSICAL EXAM
[General Appearance - Alert] : alert [General Appearance - In No Acute Distress] : in no acute distress [Auscultation Breath Sounds / Voice Sounds] : lungs were clear to auscultation bilaterally [Abnormal Walk] : normal gait [Musculoskeletal - Swelling] : no joint swelling seen [Nail Clubbing] : no clubbing  or cyanosis of the fingernails [Motor Tone] : muscle strength and tone were normal [Skin Color & Pigmentation] : normal skin color and pigmentation [Skin Turgor] : normal skin turgor [] : no rash [Oriented To Time, Place, And Person] : oriented to person, place, and time [Impaired Insight] : insight and judgment were intact [Affect] : the affect was normal [FreeTextEntry1] : severe pain with AROM of the left shoulder -

## 2024-09-21 NOTE — REVIEW OF SYSTEMS
[SOB on Exertion] : shortness of breath during exertion [Joint Pain] : joint pain [As Noted in HPI] : as noted in HPI [Difficulty Walking] : difficulty walking [Negative] : Heme/Lymph [Fever] : no fever [Chills] : no chills [Nasal Discharge] : no nasal discharge [Sore Throat] : no sore throat [Chest Pain] : no chest pain [Palpitations] : no palpitations [Lower Ext Edema] : no lower extremity edema [FreeTextEntry9] : b/l ankle pain, lower back pain, nodules in PIP/DIP joints occasionally draining fluid [de-identified] : telangiectasias on face, small raised bumps on arms/legs (not evaluated yet), nodules/cysts in PIP/DIP joints occasionally draining fluid as above

## 2024-09-21 NOTE — REVIEW OF SYSTEMS
[SOB on Exertion] : shortness of breath during exertion [As Noted in HPI] : as noted in HPI [Joint Pain] : joint pain [Difficulty Walking] : difficulty walking [Negative] : Heme/Lymph [Fever] : no fever [Chills] : no chills [Nasal Discharge] : no nasal discharge [Sore Throat] : no sore throat [Chest Pain] : no chest pain [Palpitations] : no palpitations [Lower Ext Edema] : no lower extremity edema [FreeTextEntry9] : b/l ankle pain, lower back pain, nodules in PIP/DIP joints occasionally draining fluid [de-identified] : telangiectasias on face, small raised bumps on arms/legs (not evaluated yet), nodules/cysts in PIP/DIP joints occasionally draining fluid as above

## 2024-09-21 NOTE — REVIEW OF SYSTEMS
[SOB on Exertion] : shortness of breath during exertion [As Noted in HPI] : as noted in HPI [Joint Pain] : joint pain [Difficulty Walking] : difficulty walking [Negative] : Heme/Lymph [Fever] : no fever [Chills] : no chills [Nasal Discharge] : no nasal discharge [Sore Throat] : no sore throat [Chest Pain] : no chest pain [Palpitations] : no palpitations [Lower Ext Edema] : no lower extremity edema [FreeTextEntry9] : b/l ankle pain, lower back pain, nodules in PIP/DIP joints occasionally draining fluid [de-identified] : telangiectasias on face, small raised bumps on arms/legs (not evaluated yet), nodules/cysts in PIP/DIP joints occasionally draining fluid as above

## 2024-09-21 NOTE — HISTORY OF PRESENT ILLNESS
[___ Week(s) Ago] : [unfilled] week(s) ago [FreeTextEntry1] : Ms. Viveros is a non-smoking female with scleroderma (diagnosed 2001- sx of skin tightening in hands and around mouth, Raynaud's, telangiectasias, reported calcinosis, dysphagia, and recent onset ILD), Hashimoto's thyroiditis, HTN, HLD, DM2, congenital adrenal hyperplasia (reported and in chart, on chronic steroids, currently on prednisone 5 mg QD), wheelchair bound recently due to dyspnea + bilateral ankles deformity, here for follow-up of her scleroderma treatment.  INTERVAL Hx  -Pt stopped MTX about 1.5weeks ago, and reports still has ongoing diarrhea needs imodium but improving. Also reports increased hair shedding -Rx macrobid for UTI sx, neg culture but positive UA 09/2024 , moderate bacteria. Will complete abx Friday morning -Patient attributes sx to combination Voltaren and MTX -10/2025 for ophtho eval, willing to consider HCQ - G6PD nl 06/2024 -Vitamin D 28 start 32115X once weekly  Labs 09/11/24: WBC 12, CRP 21, +UA, neg/nl CMP,   in terms of the scleroderma  - stable and no complaints   --------------------------------------------------------  BACKGROUND INFORMATION Previously was seeing Dr. Spicer- as mentioned above was on prednisone 5 mg QD (though more for adrenal issues than for scleroderma) and diclofenac 75 mg BID for years (though takes it once daily at this time). Other doctors (endocrine) have tried to taper this steroid dosage (pt recalls being switched to hydrocortisone recently, however, was unable to wean steroids due to her breathing getting worse with lower dose of steroids).  Pt had a CT chest 3/2021 showing mild ILD in lung bases- has not repeated since then. Has not yet gotten PFT's.   Patient notes difficulty swallowing for many years though has never had a GI evaluation. Notes having COVID-19 on two occasions, once in 2/2020 (onset of pandemic although not test confirmed), was very ill at the time, very short of breath and had high fever for 2 weeks. Notes significant decline in her breathing function since then. She used to walk up 20 steps with no difficulties prior to COVID, however now she needs wheelchair whenever she is not walking very short distances.   Patient has had Raynaud's for more than a decade, with reports of digital ulcerations in past. Per chart, had been on Procardia and prazosin in past for Raynaud's, however, reports not taking any medication for Raynaud's at this time.  Regarding skin tightening, patient notes that her skin laxity is finally improving now. Skin tightening is limited to distal hands (fingers) but is looser in forearms and in face now.

## 2024-09-24 LAB — G6PD SER-CCNC: 16.9 U/G HGB

## 2024-09-28 DIAGNOSIS — M13.0 POLYARTHRITIS, UNSPECIFIED: ICD-10-CM

## 2024-10-01 ENCOUNTER — NON-APPOINTMENT (OUTPATIENT)
Age: 69
End: 2024-10-01

## 2024-10-07 ENCOUNTER — APPOINTMENT (OUTPATIENT)
Dept: ENDOCRINOLOGY | Facility: CLINIC | Age: 69
End: 2024-10-07

## 2024-10-11 ENCOUNTER — APPOINTMENT (OUTPATIENT)
Dept: RHEUMATOLOGY | Facility: CLINIC | Age: 69
End: 2024-10-11

## 2024-10-11 DIAGNOSIS — M34.9 SYSTEMIC SCLEROSIS, UNSPECIFIED: ICD-10-CM

## 2024-10-11 DIAGNOSIS — Z79.631 LONG TERM (CURRENT) USE OF ANTIMETABOLITE AGENT: ICD-10-CM

## 2024-10-11 DIAGNOSIS — Z79.899 OTHER LONG TERM (CURRENT) DRUG THERAPY: ICD-10-CM

## 2024-10-11 DIAGNOSIS — Z51.81 ENCOUNTER FOR THERAPEUTIC DRUG LVL MONITORING: ICD-10-CM

## 2024-10-11 DIAGNOSIS — M13.0 POLYARTHRITIS, UNSPECIFIED: ICD-10-CM

## 2024-10-22 ENCOUNTER — NON-APPOINTMENT (OUTPATIENT)
Age: 69
End: 2024-10-22

## 2024-10-24 ENCOUNTER — APPOINTMENT (OUTPATIENT)
Dept: OPHTHALMOLOGY | Facility: CLINIC | Age: 69
End: 2024-10-24

## 2024-10-24 PROCEDURE — 92004 COMPRE OPH EXAM NEW PT 1/>: CPT | Mod: 1L

## 2024-10-24 PROCEDURE — 92083 EXTENDED VISUAL FIELD XM: CPT | Mod: 1L

## 2024-10-24 PROCEDURE — 92134 CPTRZ OPH DX IMG PST SGM RTA: CPT | Mod: 1L

## 2024-10-25 ENCOUNTER — RX RENEWAL (OUTPATIENT)
Age: 69
End: 2024-10-25

## 2024-10-25 ENCOUNTER — APPOINTMENT (OUTPATIENT)
Dept: RHEUMATOLOGY | Facility: CLINIC | Age: 69
End: 2024-10-25
Payer: COMMERCIAL

## 2024-10-25 VITALS — BODY MASS INDEX: 50.6 KG/M2 | HEIGHT: 61 IN | WEIGHT: 268 LBS

## 2024-10-25 DIAGNOSIS — Z79.631 LONG TERM (CURRENT) USE OF ANTIMETABOLITE AGENT: ICD-10-CM

## 2024-10-25 DIAGNOSIS — Z79.899 OTHER LONG TERM (CURRENT) DRUG THERAPY: ICD-10-CM

## 2024-10-25 DIAGNOSIS — M13.0 POLYARTHRITIS, UNSPECIFIED: ICD-10-CM

## 2024-10-25 DIAGNOSIS — M34.9 SYSTEMIC SCLEROSIS, UNSPECIFIED: ICD-10-CM

## 2024-10-25 DIAGNOSIS — R19.7 DIARRHEA, UNSPECIFIED: ICD-10-CM

## 2024-10-25 DIAGNOSIS — R60.9 EDEMA, UNSPECIFIED: ICD-10-CM

## 2024-10-25 DIAGNOSIS — M25.50 PAIN IN UNSPECIFIED JOINT: ICD-10-CM

## 2024-10-25 DIAGNOSIS — Z51.81 ENCOUNTER FOR THERAPEUTIC DRUG LVL MONITORING: ICD-10-CM

## 2024-10-25 PROCEDURE — 99215 OFFICE O/P EST HI 40 MIN: CPT

## 2024-10-25 PROCEDURE — G2211 COMPLEX E/M VISIT ADD ON: CPT | Mod: NC

## 2024-10-25 RX ORDER — FUROSEMIDE 20 MG/1
20 TABLET ORAL DAILY
Qty: 90 | Refills: 0 | Status: ACTIVE | COMMUNITY
Start: 2024-10-25 | End: 1900-01-01

## 2024-10-30 PROBLEM — Z79.631 LONG TERM METHOTREXATE USER: Status: RESOLVED | Noted: 2024-07-09 | Resolved: 2024-10-30

## 2024-10-30 LAB
ALBUMIN SERPL ELPH-MCNC: 4.1 G/DL
ALP BLD-CCNC: 98 U/L
ALT SERPL-CCNC: 16 U/L
ANION GAP SERPL CALC-SCNC: 17 MMOL/L
AST SERPL-CCNC: 16 U/L
BILIRUB SERPL-MCNC: 0.2 MG/DL
BUN SERPL-MCNC: 10 MG/DL
C3 SERPL-MCNC: 159 MG/DL
C4 SERPL-MCNC: 24 MG/DL
CALCIUM SERPL-MCNC: 9.2 MG/DL
CHLORIDE SERPL-SCNC: 105 MMOL/L
CO2 SERPL-SCNC: 19 MMOL/L
CREAT SERPL-MCNC: 0.73 MG/DL
CRP SERPL-MCNC: 12 MG/L
EGFR: 91 ML/MIN/1.73M2
ERYTHROCYTE [SEDIMENTATION RATE] IN BLOOD BY WESTERGREN METHOD: 39 MM/HR
HCT VFR BLD CALC: 47.7 %
HGB BLD-MCNC: 14.5 G/DL
MCHC RBC-ENTMCNC: 28 PG
MCHC RBC-ENTMCNC: 30.4 GM/DL
MCV RBC AUTO: 92.1 FL
PLATELET # BLD AUTO: 339 K/UL
POTASSIUM SERPL-SCNC: 4.1 MMOL/L
PROT SERPL-MCNC: 6.8 G/DL
RBC # BLD: 5.18 M/UL
RBC # FLD: 15.6 %
SODIUM SERPL-SCNC: 141 MMOL/L
WBC # FLD AUTO: 9.11 K/UL

## 2024-11-13 ENCOUNTER — APPOINTMENT (OUTPATIENT)
Dept: RHEUMATOLOGY | Facility: CLINIC | Age: 69
End: 2024-11-13

## 2024-11-13 DIAGNOSIS — Z79.899 OTHER LONG TERM (CURRENT) DRUG THERAPY: ICD-10-CM

## 2024-11-13 DIAGNOSIS — Z51.81 ENCOUNTER FOR THERAPEUTIC DRUG LVL MONITORING: ICD-10-CM

## 2024-11-14 ENCOUNTER — NON-APPOINTMENT (OUTPATIENT)
Age: 69
End: 2024-11-14

## 2024-11-14 ENCOUNTER — APPOINTMENT (OUTPATIENT)
Dept: OPHTHALMOLOGY | Facility: CLINIC | Age: 69
End: 2024-11-14
Payer: COMMERCIAL

## 2024-11-14 PROCEDURE — 99203 OFFICE O/P NEW LOW 30 MIN: CPT

## 2024-11-18 ENCOUNTER — APPOINTMENT (OUTPATIENT)
Dept: INTERNAL MEDICINE | Facility: CLINIC | Age: 69
End: 2024-11-18

## 2024-11-21 ENCOUNTER — RX RENEWAL (OUTPATIENT)
Age: 69
End: 2024-11-21

## 2024-12-11 ENCOUNTER — APPOINTMENT (OUTPATIENT)
Dept: RHEUMATOLOGY | Facility: CLINIC | Age: 69
End: 2024-12-11
Payer: COMMERCIAL

## 2024-12-11 VITALS
HEIGHT: 61 IN | WEIGHT: 268 LBS | DIASTOLIC BLOOD PRESSURE: 79 MMHG | BODY MASS INDEX: 50.6 KG/M2 | HEART RATE: 90 BPM | SYSTOLIC BLOOD PRESSURE: 126 MMHG | OXYGEN SATURATION: 99 %

## 2024-12-11 DIAGNOSIS — R60.9 EDEMA, UNSPECIFIED: ICD-10-CM

## 2024-12-11 DIAGNOSIS — Z51.81 ENCOUNTER FOR THERAPEUTIC DRUG LVL MONITORING: ICD-10-CM

## 2024-12-11 DIAGNOSIS — R19.7 DIARRHEA, UNSPECIFIED: ICD-10-CM

## 2024-12-11 DIAGNOSIS — Z79.899 OTHER LONG TERM (CURRENT) DRUG THERAPY: ICD-10-CM

## 2024-12-11 DIAGNOSIS — R20.2 PARESTHESIA OF SKIN: ICD-10-CM

## 2024-12-11 DIAGNOSIS — M13.0 POLYARTHRITIS, UNSPECIFIED: ICD-10-CM

## 2024-12-11 DIAGNOSIS — M25.512 PAIN IN LEFT SHOULDER: ICD-10-CM

## 2024-12-11 DIAGNOSIS — E55.9 VITAMIN D DEFICIENCY, UNSPECIFIED: ICD-10-CM

## 2024-12-11 DIAGNOSIS — I73.00 RAYNAUD'S SYNDROME W/OUT GANGRENE: ICD-10-CM

## 2024-12-11 DIAGNOSIS — M34.9 SYSTEMIC SCLEROSIS, UNSPECIFIED: ICD-10-CM

## 2024-12-11 DIAGNOSIS — M25.50 PAIN IN UNSPECIFIED JOINT: ICD-10-CM

## 2024-12-11 DIAGNOSIS — K21.9 GASTRO-ESOPHAGEAL REFLUX DISEASE W/OUT ESOPHAGITIS: ICD-10-CM

## 2024-12-11 PROCEDURE — G2211 COMPLEX E/M VISIT ADD ON: CPT | Mod: NC

## 2024-12-11 PROCEDURE — 99215 OFFICE O/P EST HI 40 MIN: CPT

## 2024-12-12 PROBLEM — I73.00 RAYNAUD DISEASE: Status: ACTIVE | Noted: 2024-12-12

## 2024-12-12 PROBLEM — M25.512 LEFT SHOULDER PAIN, UNSPECIFIED CHRONICITY: Status: ACTIVE | Noted: 2024-12-12

## 2024-12-12 PROBLEM — Z79.899 NEW MEDICATION ADDED: Status: RESOLVED | Noted: 2024-09-21 | Resolved: 2024-12-12

## 2024-12-12 PROBLEM — Z79.899 NEW MEDICATION ADDED: Status: ACTIVE | Noted: 2024-12-12

## 2024-12-12 PROBLEM — K21.9 GERD (GASTROESOPHAGEAL REFLUX DISEASE): Status: ACTIVE | Noted: 2024-12-12

## 2024-12-12 LAB
25(OH)D3 SERPL-MCNC: 23.6 NG/ML
ALBUMIN SERPL ELPH-MCNC: 4.3 G/DL
ALP BLD-CCNC: 108 U/L
ALT SERPL-CCNC: 13 U/L
ANION GAP SERPL CALC-SCNC: 15 MMOL/L
AST SERPL-CCNC: 14 U/L
BILIRUB SERPL-MCNC: 0.2 MG/DL
BUN SERPL-MCNC: 12 MG/DL
CALCIUM SERPL-MCNC: 9.6 MG/DL
CHLORIDE SERPL-SCNC: 109 MMOL/L
CO2 SERPL-SCNC: 21 MMOL/L
CREAT SERPL-MCNC: 0.8 MG/DL
CRP SERPL-MCNC: 15 MG/L
EGFR: 81 ML/MIN/1.73M2
ERYTHROCYTE [SEDIMENTATION RATE] IN BLOOD BY WESTERGREN METHOD: 6 MM/HR
FOLATE SERPL-MCNC: 10.4 NG/ML
HCT VFR BLD CALC: 47.7 %
HGB BLD-MCNC: 14.8 G/DL
MCHC RBC-ENTMCNC: 27.8 PG
MCHC RBC-ENTMCNC: 31 G/DL
MCV RBC AUTO: 89.7 FL
NT-PROBNP SERPL-MCNC: 176 PG/ML
PLATELET # BLD AUTO: 338 K/UL
POTASSIUM SERPL-SCNC: 4.3 MMOL/L
PROT SERPL-MCNC: 6.9 G/DL
RBC # BLD: 5.32 M/UL
RBC # FLD: 14.6 %
SODIUM SERPL-SCNC: 145 MMOL/L
VIT B12 SERPL-MCNC: 379 PG/ML
WBC # FLD AUTO: 7.94 K/UL

## 2024-12-12 RX ORDER — AMLODIPINE BESYLATE 5 MG/1
5 TABLET ORAL DAILY
Qty: 90 | Refills: 0 | Status: ACTIVE | COMMUNITY
Start: 2024-12-12 | End: 1900-01-01

## 2024-12-16 LAB
HOMOCYSTEINE LEVEL: 14.9 UMOL/L
METHYLMALONATE SERPL-SCNC: 153 NMOL/L

## 2024-12-18 ENCOUNTER — APPOINTMENT (OUTPATIENT)
Dept: ENDOCRINOLOGY | Facility: CLINIC | Age: 69
End: 2024-12-18
Payer: COMMERCIAL

## 2024-12-18 VITALS
HEART RATE: 100 BPM | DIASTOLIC BLOOD PRESSURE: 77 MMHG | SYSTOLIC BLOOD PRESSURE: 156 MMHG | HEIGHT: 61 IN | OXYGEN SATURATION: 97 %

## 2024-12-18 VITALS — BODY MASS INDEX: 50.26 KG/M2 | WEIGHT: 266 LBS

## 2024-12-18 DIAGNOSIS — E11.65 TYPE 2 DIABETES MELLITUS WITH HYPERGLYCEMIA: ICD-10-CM

## 2024-12-18 DIAGNOSIS — E25.9 ADRENOGENITAL DISORDER, UNSPECIFIED: ICD-10-CM

## 2024-12-18 DIAGNOSIS — E03.9 HYPOTHYROIDISM, UNSPECIFIED: ICD-10-CM

## 2024-12-18 DIAGNOSIS — E78.5 HYPERLIPIDEMIA, UNSPECIFIED: ICD-10-CM

## 2024-12-18 LAB — HBA1C MFR BLD HPLC: 5.5

## 2024-12-18 PROCEDURE — 99214 OFFICE O/P EST MOD 30 MIN: CPT

## 2024-12-18 PROCEDURE — 83036 HEMOGLOBIN GLYCOSYLATED A1C: CPT | Mod: QW

## 2024-12-19 ENCOUNTER — APPOINTMENT (OUTPATIENT)
Dept: OPHTHALMOLOGY | Facility: CLINIC | Age: 69
End: 2024-12-19

## 2024-12-26 RX ORDER — SUCRALFATE 1 G/10ML
1 SUSPENSION ORAL 3 TIMES DAILY
Qty: 1 | Refills: 0 | Status: ACTIVE | COMMUNITY
Start: 2024-12-18

## 2025-01-14 ENCOUNTER — APPOINTMENT (OUTPATIENT)
Dept: RHEUMATOLOGY | Facility: CLINIC | Age: 70
End: 2025-01-14
Payer: COMMERCIAL

## 2025-01-14 VITALS
HEIGHT: 61 IN | DIASTOLIC BLOOD PRESSURE: 80 MMHG | OXYGEN SATURATION: 97 % | WEIGHT: 268 LBS | HEART RATE: 91 BPM | BODY MASS INDEX: 50.6 KG/M2 | SYSTOLIC BLOOD PRESSURE: 132 MMHG

## 2025-01-14 DIAGNOSIS — Z79.899 OTHER LONG TERM (CURRENT) DRUG THERAPY: ICD-10-CM

## 2025-01-14 DIAGNOSIS — M13.0 POLYARTHRITIS, UNSPECIFIED: ICD-10-CM

## 2025-01-14 DIAGNOSIS — Z51.81 ENCOUNTER FOR THERAPEUTIC DRUG LVL MONITORING: ICD-10-CM

## 2025-01-14 DIAGNOSIS — M34.9 SYSTEMIC SCLEROSIS, UNSPECIFIED: ICD-10-CM

## 2025-01-14 DIAGNOSIS — T88.7XXA UNSPECIFIED ADVERSE EFFECT OF DRUG OR MEDICAMENT, INITIAL ENCOUNTER: ICD-10-CM

## 2025-01-14 DIAGNOSIS — I73.00 RAYNAUD'S SYNDROME W/OUT GANGRENE: ICD-10-CM

## 2025-01-14 DIAGNOSIS — R19.7 DIARRHEA, UNSPECIFIED: ICD-10-CM

## 2025-01-14 DIAGNOSIS — Z79.60 LONG TERM (CURRENT) USE OF UNSPECIFIED IMMUNOMODULATORS AND IMMUNOSUPPRESSANTS: ICD-10-CM

## 2025-01-14 PROCEDURE — 99215 OFFICE O/P EST HI 40 MIN: CPT

## 2025-01-14 PROCEDURE — G2211 COMPLEX E/M VISIT ADD ON: CPT | Mod: NC

## 2025-01-17 PROBLEM — T88.7XXA MEDICATION SIDE EFFECT: Status: ACTIVE | Noted: 2025-01-17

## 2025-01-17 RX ORDER — SILDENAFIL 20 MG/1
20 TABLET ORAL
Qty: 30 | Refills: 8 | Status: ACTIVE | COMMUNITY
Start: 2025-01-17

## 2025-01-24 DIAGNOSIS — D75.1 SECONDARY POLYCYTHEMIA: ICD-10-CM

## 2025-01-24 LAB
25(OH)D3 SERPL-MCNC: 33 NG/ML
ALBUMIN SERPL ELPH-MCNC: 4.4 G/DL
ALP BLD-CCNC: 117 U/L
ALT SERPL-CCNC: 16 U/L
ANION GAP SERPL CALC-SCNC: 12 MMOL/L
AST SERPL-CCNC: 15 U/L
BILIRUB SERPL-MCNC: 0.4 MG/DL
BUN SERPL-MCNC: 13 MG/DL
CALCIUM SERPL-MCNC: 10.2 MG/DL
CHLORIDE SERPL-SCNC: 107 MMOL/L
CO2 SERPL-SCNC: 24 MMOL/L
CREAT SERPL-MCNC: 0.74 MG/DL
CRP SERPL-MCNC: 12 MG/L
EGFR: 89 ML/MIN/1.73M2
ERYTHROCYTE [SEDIMENTATION RATE] IN BLOOD BY WESTERGREN METHOD: 32 MM/HR
HCT VFR BLD CALC: 51.8 %
HGB BLD-MCNC: 15.9 G/DL
MCHC RBC-ENTMCNC: 26.7 PG
MCHC RBC-ENTMCNC: 30.7 G/DL
MCV RBC AUTO: 86.9 FL
PLATELET # BLD AUTO: 419 K/UL
POTASSIUM SERPL-SCNC: 4.8 MMOL/L
PROT SERPL-MCNC: 7.6 G/DL
RBC # BLD: 5.96 M/UL
RBC # FLD: 15.3 %
SODIUM SERPL-SCNC: 143 MMOL/L
VIT B1 SERPL-MCNC: 193.7 NMOL/L
VIT B12 SERPL-MCNC: 850 PG/ML
VIT B2 SERPL-MCNC: 445 UG/L
VIT B6 SERPL-MCNC: 40.4 UG/L
VIT C SERPL-MCNC: 0.5 MG/DL
WBC # FLD AUTO: 10.5 K/UL
ZINC SERPL-MCNC: 92 UG/DL

## 2025-02-24 NOTE — ASSESSMENT
[facility] Step[facility]  Initial Discharge Assessment       Primary Care Provider: Cameron Tran MD    Admission Diagnosis: Chest discomfort [R07.89]  Chest pain [R07.9]    Admission Date: 2/19/2025  Expected Discharge Date: 2/26/2025    Transition of Care Barriers: (P) None    Payor: [employer] MANAGED MEDICARE / Plan: [employer] MEDICARE PLAN PPO / Product Type: Medicare Advantage /     Extended Emergency Contact Information  Primary Emergency Contact: HelgaMeenu mccormack  Address: [address] [place] [address] [place] [address] [place]  Mobile Phone: [phone]  Relation: Spouse    Discharge Plan A: (P) Home Health  Discharge Plan B: (P) Home with family      [id number] - [place], [place] - [address] [facility]  [address]  [facility] [address]  Phone: [phone] Fax: [phone]      Initial Assessment (most recent)       Adult Discharge Assessment - 02/23/25 1841          Discharge Assessment    Assessment Type Discharge Planning Assessment (P)      Confirmed/corrected address, phone number and insurance Yes (P)      Confirmed Demographics Correct on Facesheet (P)      Source of Information patient;family (P)      When was your last doctors appointment? -- (P)    May 2024    Communicated KIRTI with patient/caregiver Date not available/Unable to determine (P)      Reason For Admission S/P CABG (P)      People in Home spouse (P)      Do you expect to return to your current living situation? Yes (P)      Do you have help at home or someone to help you manage your care at home? Yes (P)      Who are your caregiver(s) and their phone number(s)? SpouseMeenu (c) [phone] (P)      Prior to hospitilization cognitive status: Alert/Oriented (P)      Current cognitive status: Alert/Oriented (P)      Walking or Climbing Stairs Difficulty no (P)      Dressing/Bathing Difficulty no (P)      Equipment Currently Used at Home none (P)      Readmission within 30  [FreeTextEntry1] : 65 year old female with past medical history of Diabetes Mellitus Type 2, CAH on prednisone, Scleroderma, Hashimotos Hypothyroidism, Adrenal Nodules, Wheelchair bound with cane for short distances with ankle issues who presents for management\par \par 1. DM 2- uncontrolled, uncomplicated\par Patient counseled on the importance of diabetic control and complications. Patient's diet and the necessary changes discussed. Reviewed over freestyle yuliya and use. Reviewed over glycemic goals. Hga1c improving.\par \par Cont Glimepiride 3 mg BID\par Inc to Trulicity 1.5  mg qweekly\par Check fsg \par Cont diabetic diet\par Cont exercise\par \par Opthalmology Visit needs follow up\par Foot Exam up to date\par \par 2. HLD- stable\par Cont Rosuvastatin\par \par 3. Hypothyroidism\par Cont Levothyroxine 175 mcg QD\par \par 4. CAH\par Unclear if patient really hs CAH. But cannot dx until she is switched to lower doses of hydrocortisone\par Cont Prednisone \par Will hold off on taper for now\par  days? No (P)      Patient currently being followed by outpatient case management? No (P)      Do you currently have service(s) that help you manage your care at home? No (P)      Do you take prescription medications? Yes (P)      Do you have prescription coverage? Yes (P)      Coverage [employer] Managed Medicare (P)      Do you have any problems affording any of your prescribed medications? No (P)      Is the patient taking medications as prescribed? yes (P)      Who is going to help you get home at discharge? Spouse (P)      How do you get to doctors appointments? car, drives self (P)      Are you on dialysis? No (P)      Do you take coumadin? No (P)      Discharge Plan A Home Health (P)      Discharge Plan B Home with family (P)      DME Needed Upon Discharge  other (see comments) (P)    TBD.    Discharge Plan discussed with: Patient;Spouse/sig other (P)      Name(s) and Number(s) Spouse, Meenu Partida (c) [phone] (P)      Transition of Care Barriers None (P)         Physical Activity    On average, how many days per week do you engage in moderate to strenuous exercise (like a brisk walk)? 0 days (P)      On average, how many minutes do you engage in exercise at this level? 0 min (P)         Financial Resource Strain    How hard is it for you to pay for the very basics like food, housing, medical care, and heating? Not hard at all (P)         Housing Stability    In the last 12 months, was there a time when you were not able to pay the mortgage or rent on time? No (P)      At any time in the past 12 months, were you homeless or living in a shelter (including now)? No (P)         Transportation Needs    Has the lack of transportation kept you from medical appointments, meetings, work or from getting things needed for daily living? No (P)         Food Insecurity    Within the past 12 months, you worried that your food would run out before you got the money to buy more. Never true (P)      Within the past 12  months, the food you bought just didn't last and you didn't have money to get more. Never true (P)         Stress    Do you feel stress - tense, restless, nervous, or anxious, or unable to sleep at night because your mind is troubled all the time - these days? Not at all (P)         Social Isolation    How often do you feel lonely or isolated from those around you?  Never (P)         Alcohol Use    Q1: How often do you have a drink containing alcohol? Never (P)      Q2: How many drinks containing alcohol do you have on a typical day when you are drinking? Patient does not drink (P)      Q3: How often do you have six or more drinks on one occasion? Never (P)         Utilities    In the past 12 months has the electric, gas, oil, or water company threatened to shut off services in your home? No (P)         Health Literacy    How often do you need to have someone help you when you read instructions, pamphlets, or other written material from your doctor or pharmacy? Never (P)         OTHER    Name(s) of People in Home Spouse (P)                    SW met with pt and spouse at bedside to complete Initial Discharge Planning Assessment. Pt. and spouse, Meenu live in their home in [place], [place]. No DME, Home Health, Dialysis or Coumadin. Pt. drives, but spouse will provide transportation home upon discharge. Pt. Planning to walk with therapy tomorrow if chest tubes are removed. SW noting PCP, Dr. Sheryl Brothers with [facility]. In [place], [place].     Discharge Plan A and Plan B have been determined by review of patient's clinical status, future medical and therapeutic needs, and coverage/benefits for post-acute care in coordination with multidisciplinary team members.     Ga Miles, JESSE

## 2025-03-11 ENCOUNTER — APPOINTMENT (OUTPATIENT)
Dept: ENDOCRINOLOGY | Facility: CLINIC | Age: 70
End: 2025-03-11
Payer: COMMERCIAL

## 2025-03-11 VITALS
WEIGHT: 275 LBS | DIASTOLIC BLOOD PRESSURE: 94 MMHG | OXYGEN SATURATION: 97 % | HEIGHT: 61 IN | BODY MASS INDEX: 51.92 KG/M2 | HEART RATE: 99 BPM | SYSTOLIC BLOOD PRESSURE: 150 MMHG

## 2025-03-11 DIAGNOSIS — E03.9 HYPOTHYROIDISM, UNSPECIFIED: ICD-10-CM

## 2025-03-11 DIAGNOSIS — E66.01 OBESITY, CLASS 3: ICD-10-CM

## 2025-03-11 DIAGNOSIS — E11.65 TYPE 2 DIABETES MELLITUS WITH HYPERGLYCEMIA: ICD-10-CM

## 2025-03-11 DIAGNOSIS — E78.5 HYPERLIPIDEMIA, UNSPECIFIED: ICD-10-CM

## 2025-03-11 DIAGNOSIS — E66.813 OBESITY, CLASS 3: ICD-10-CM

## 2025-03-11 PROCEDURE — 83036 HEMOGLOBIN GLYCOSYLATED A1C: CPT | Mod: QW

## 2025-03-11 PROCEDURE — 99214 OFFICE O/P EST MOD 30 MIN: CPT

## 2025-03-11 RX ORDER — TIRZEPATIDE 12.5 MG/.5ML
12.5 INJECTION, SOLUTION SUBCUTANEOUS
Qty: 12 | Refills: 2 | Status: ACTIVE | COMMUNITY
Start: 2025-03-03 | End: 1900-01-01

## 2025-03-11 RX ORDER — TIRZEPATIDE 12.5 MG/.5ML
12.5 INJECTION, SOLUTION SUBCUTANEOUS
Qty: 12 | Refills: 2 | Status: DISCONTINUED | COMMUNITY
Start: 2025-03-03 | End: 2025-03-11

## 2025-03-11 RX ORDER — PHENTERMINE HYDROCHLORIDE 15 MG/1
15 CAPSULE ORAL
Qty: 30 | Refills: 2 | Status: ACTIVE | COMMUNITY
Start: 2025-03-11 | End: 1900-01-01

## 2025-03-14 ENCOUNTER — RX RENEWAL (OUTPATIENT)
Age: 70
End: 2025-03-14

## 2025-03-14 RX ORDER — ERGOCALCIFEROL 1.25 MG/1
1.25 MG CAPSULE, LIQUID FILLED ORAL
Qty: 12 | Refills: 0 | Status: ACTIVE | COMMUNITY
Start: 2025-03-14 | End: 1900-01-01

## 2025-04-04 ENCOUNTER — APPOINTMENT (OUTPATIENT)
Dept: RHEUMATOLOGY | Facility: CLINIC | Age: 70
End: 2025-04-04
Payer: COMMERCIAL

## 2025-04-04 VITALS
HEART RATE: 96 BPM | HEIGHT: 61 IN | OXYGEN SATURATION: 97 % | BODY MASS INDEX: 51.73 KG/M2 | SYSTOLIC BLOOD PRESSURE: 110 MMHG | DIASTOLIC BLOOD PRESSURE: 70 MMHG | WEIGHT: 274 LBS

## 2025-04-04 DIAGNOSIS — Z51.81 ENCOUNTER FOR THERAPEUTIC DRUG LVL MONITORING: ICD-10-CM

## 2025-04-04 DIAGNOSIS — M25.512 PAIN IN RIGHT SHOULDER: ICD-10-CM

## 2025-04-04 DIAGNOSIS — M25.511 PAIN IN RIGHT SHOULDER: ICD-10-CM

## 2025-04-04 DIAGNOSIS — M34.9 SYSTEMIC SCLEROSIS, UNSPECIFIED: ICD-10-CM

## 2025-04-04 DIAGNOSIS — Z79.60 LONG TERM (CURRENT) USE OF UNSPECIFIED IMMUNOMODULATORS AND IMMUNOSUPPRESSANTS: ICD-10-CM

## 2025-04-04 DIAGNOSIS — I73.00 RAYNAUD'S SYNDROME W/OUT GANGRENE: ICD-10-CM

## 2025-04-04 PROCEDURE — 99215 OFFICE O/P EST HI 40 MIN: CPT

## 2025-04-04 PROCEDURE — G2211 COMPLEX E/M VISIT ADD ON: CPT | Mod: NC

## 2025-04-06 PROBLEM — M25.511 BILATERAL SHOULDER PAIN: Status: ACTIVE | Noted: 2025-04-06

## 2025-04-06 LAB
ALBUMIN SERPL ELPH-MCNC: 4.1 G/DL
ALP BLD-CCNC: 96 U/L
ALT SERPL-CCNC: 26 U/L
ANION GAP SERPL CALC-SCNC: 13 MMOL/L
AST SERPL-CCNC: 23 U/L
BILIRUB SERPL-MCNC: 0.8 MG/DL
BUN SERPL-MCNC: 14 MG/DL
CALCIUM SERPL-MCNC: 9.8 MG/DL
CHLORIDE SERPL-SCNC: 106 MMOL/L
CO2 SERPL-SCNC: 22 MMOL/L
CREAT SERPL-MCNC: 0.92 MG/DL
CRP SERPL-MCNC: 7 MG/L
EGFRCR SERPLBLD CKD-EPI 2021: 68 ML/MIN/1.73M2
ERYTHROCYTE [SEDIMENTATION RATE] IN BLOOD BY WESTERGREN METHOD: 11 MM/HR
HCT VFR BLD CALC: 49.6 %
HGB BLD-MCNC: 16 G/DL
MCHC RBC-ENTMCNC: 26.9 PG
MCHC RBC-ENTMCNC: 32.3 G/DL
MCV RBC AUTO: 83.5 FL
PLATELET # BLD AUTO: 340 K/UL
POTASSIUM SERPL-SCNC: 4.6 MMOL/L
PROT SERPL-MCNC: 7.1 G/DL
RBC # BLD: 5.94 M/UL
RBC # FLD: 17.8 %
SODIUM SERPL-SCNC: 140 MMOL/L
WBC # FLD AUTO: 9.81 K/UL

## 2025-04-07 ENCOUNTER — RX RENEWAL (OUTPATIENT)
Age: 70
End: 2025-04-07

## 2025-05-15 ENCOUNTER — APPOINTMENT (OUTPATIENT)
Dept: ENDOCRINOLOGY | Facility: CLINIC | Age: 70
End: 2025-05-15
Payer: COMMERCIAL

## 2025-05-15 DIAGNOSIS — E25.9 ADRENOGENITAL DISORDER, UNSPECIFIED: ICD-10-CM

## 2025-05-15 DIAGNOSIS — E66.01 OBESITY, CLASS 3: ICD-10-CM

## 2025-05-15 DIAGNOSIS — E11.65 TYPE 2 DIABETES MELLITUS WITH HYPERGLYCEMIA: ICD-10-CM

## 2025-05-15 DIAGNOSIS — E03.9 HYPOTHYROIDISM, UNSPECIFIED: ICD-10-CM

## 2025-05-15 DIAGNOSIS — E66.813 OBESITY, CLASS 3: ICD-10-CM

## 2025-05-15 PROCEDURE — 99214 OFFICE O/P EST MOD 30 MIN: CPT

## 2025-05-23 ENCOUNTER — APPOINTMENT (OUTPATIENT)
Dept: RHEUMATOLOGY | Facility: CLINIC | Age: 70
End: 2025-05-23

## 2025-05-23 DIAGNOSIS — I73.00 RAYNAUD'S SYNDROME W/OUT GANGRENE: ICD-10-CM

## 2025-05-23 DIAGNOSIS — M34.9 SYSTEMIC SCLEROSIS, UNSPECIFIED: ICD-10-CM

## 2025-05-23 DIAGNOSIS — Z51.81 ENCOUNTER FOR THERAPEUTIC DRUG LVL MONITORING: ICD-10-CM

## 2025-05-23 DIAGNOSIS — M25.50 PAIN IN UNSPECIFIED JOINT: ICD-10-CM

## 2025-05-23 DIAGNOSIS — Z79.60 LONG TERM (CURRENT) USE OF UNSPECIFIED IMMUNOMODULATORS AND IMMUNOSUPPRESSANTS: ICD-10-CM

## 2025-06-06 ENCOUNTER — APPOINTMENT (OUTPATIENT)
Dept: RHEUMATOLOGY | Facility: CLINIC | Age: 70
End: 2025-06-06
Payer: COMMERCIAL

## 2025-06-06 VITALS
DIASTOLIC BLOOD PRESSURE: 84 MMHG | OXYGEN SATURATION: 94 % | WEIGHT: 270 LBS | HEIGHT: 61 IN | BODY MASS INDEX: 50.98 KG/M2 | SYSTOLIC BLOOD PRESSURE: 124 MMHG | HEART RATE: 90 BPM

## 2025-06-06 PROCEDURE — 99215 OFFICE O/P EST HI 40 MIN: CPT

## 2025-06-06 PROCEDURE — G2211 COMPLEX E/M VISIT ADD ON: CPT | Mod: NC

## 2025-06-11 LAB
ALBUMIN SERPL ELPH-MCNC: 4.2 G/DL
ALP BLD-CCNC: 109 U/L
ALT SERPL-CCNC: 22 U/L
ANION GAP SERPL CALC-SCNC: 15 MMOL/L
AST SERPL-CCNC: 18 U/L
BILIRUB SERPL-MCNC: 0.7 MG/DL
BUN SERPL-MCNC: 8 MG/DL
C3 SERPL-MCNC: 145 MG/DL
C4 SERPL-MCNC: 22 MG/DL
CALCIUM SERPL-MCNC: 10.1 MG/DL
CHLORIDE SERPL-SCNC: 106 MMOL/L
CO2 SERPL-SCNC: 22 MMOL/L
CREAT SERPL-MCNC: 0.8 MG/DL
CRP SERPL-MCNC: 17 MG/L
DSDNA AB SER-ACNC: 1 IU/ML
EGFRCR SERPLBLD CKD-EPI 2021: 81 ML/MIN/1.73M2
ERYTHROCYTE [SEDIMENTATION RATE] IN BLOOD BY WESTERGREN METHOD: 14 MM/HR
HCT VFR BLD CALC: 50.7 %
HGB BLD-MCNC: 15.6 G/DL
MCHC RBC-ENTMCNC: 26.4 PG
MCHC RBC-ENTMCNC: 30.8 G/DL
MCV RBC AUTO: 85.6 FL
PLATELET # BLD AUTO: 332 K/UL
POTASSIUM SERPL-SCNC: 4.3 MMOL/L
PROT SERPL-MCNC: 7.2 G/DL
RBC # BLD: 5.92 M/UL
RBC # FLD: 18.6 %
SODIUM SERPL-SCNC: 143 MMOL/L
WBC # FLD AUTO: 10.87 K/UL

## 2025-06-24 ENCOUNTER — APPOINTMENT (OUTPATIENT)
Dept: ORTHOPEDIC SURGERY | Facility: CLINIC | Age: 70
End: 2025-06-24
Payer: COMMERCIAL

## 2025-06-24 VITALS — BODY MASS INDEX: 50.98 KG/M2 | HEIGHT: 61 IN | WEIGHT: 270 LBS

## 2025-06-24 PROBLEM — M75.52 BURSITIS OF LEFT SHOULDER: Status: ACTIVE | Noted: 2025-06-24

## 2025-06-24 PROCEDURE — 72040 X-RAY EXAM NECK SPINE 2-3 VW: CPT

## 2025-06-24 PROCEDURE — J3490M: CUSTOM

## 2025-06-24 PROCEDURE — 20611 DRAIN/INJ JOINT/BURSA W/US: CPT | Mod: LT

## 2025-06-24 PROCEDURE — 99203 OFFICE O/P NEW LOW 30 MIN: CPT | Mod: 25

## 2025-06-24 PROCEDURE — 73030 X-RAY EXAM OF SHOULDER: CPT | Mod: LT

## 2025-06-24 PROCEDURE — 73010 X-RAY EXAM OF SHOULDER BLADE: CPT | Mod: LT

## 2025-06-27 ENCOUNTER — APPOINTMENT (OUTPATIENT)
Dept: GASTROENTEROLOGY | Facility: CLINIC | Age: 70
End: 2025-06-27
Payer: COMMERCIAL

## 2025-06-27 VITALS
HEIGHT: 61 IN | HEART RATE: 87 BPM | OXYGEN SATURATION: 97 % | BODY MASS INDEX: 50.98 KG/M2 | WEIGHT: 270 LBS | SYSTOLIC BLOOD PRESSURE: 144 MMHG | DIASTOLIC BLOOD PRESSURE: 85 MMHG

## 2025-06-27 PROBLEM — R14.3 FLATULENCE: Status: ACTIVE | Noted: 2025-06-27

## 2025-06-27 PROBLEM — R14.3 EXCESSIVE GAS: Status: ACTIVE | Noted: 2025-06-27

## 2025-06-27 PROBLEM — R14.2 BELCHING: Status: ACTIVE | Noted: 2025-06-27

## 2025-06-27 PROBLEM — K59.00 CONSTIPATION: Status: ACTIVE | Noted: 2025-06-27

## 2025-06-27 PROCEDURE — G2211 COMPLEX E/M VISIT ADD ON: CPT | Mod: NC

## 2025-06-27 PROCEDURE — 99204 OFFICE O/P NEW MOD 45 MIN: CPT

## 2025-06-27 RX ORDER — RIVAROXABAN 155 MG/1
1 GRANULE, FOR SUSPENSION ORAL
Refills: 0 | Status: ACTIVE | COMMUNITY

## 2025-06-27 RX ORDER — DILTIAZEM HCL 5 MG/ML
5 SYRINGE (ML) INTRAVENOUS
Refills: 0 | Status: ACTIVE | COMMUNITY

## 2025-06-29 LAB
25(OH)D3 SERPL-MCNC: 38.6 NG/ML
GLIADIN IGA SER QL: <0.2 U/ML
GLIADIN IGG SER QL: <0.4 U/ML
GLIADIN PEPTIDE IGA SER-ACNC: NEGATIVE
GLIADIN PEPTIDE IGG SER-ACNC: NEGATIVE
HBV CORE IGG+IGM SER QL: NONREACTIVE
HBV SURFACE AB SERPL IA-ACNC: <3.3 MIU/ML
HCV AB SER QL: NONREACTIVE
HCV S/CO RATIO: 0.12 S/CO
IGA SERPL-MCNC: 288 MG/DL
TTG IGA SER IA-ACNC: <0.5 U/ML
TTG IGA SER-ACNC: NEGATIVE
TTG IGG SER IA-ACNC: <0.8 U/ML
TTG IGG SER IA-ACNC: NEGATIVE
VIT B12 SERPL-MCNC: 524 PG/ML

## 2025-07-01 LAB
ENDOMYSIUM IGA SER QL: NEGATIVE
ENDOMYSIUM IGA TITR SER: NORMAL

## 2025-07-29 ENCOUNTER — APPOINTMENT (OUTPATIENT)
Dept: RHEUMATOLOGY | Facility: CLINIC | Age: 70
End: 2025-07-29
Payer: COMMERCIAL

## 2025-07-29 VITALS
SYSTOLIC BLOOD PRESSURE: 119 MMHG | HEART RATE: 95 BPM | WEIGHT: 274 LBS | BODY MASS INDEX: 51.73 KG/M2 | HEIGHT: 61 IN | DIASTOLIC BLOOD PRESSURE: 79 MMHG | OXYGEN SATURATION: 95 %

## 2025-07-29 DIAGNOSIS — M34.9 SYSTEMIC SCLEROSIS, UNSPECIFIED: ICD-10-CM

## 2025-07-29 DIAGNOSIS — J84.9 INTERSTITIAL PULMONARY DISEASE, UNSPECIFIED: ICD-10-CM

## 2025-07-29 DIAGNOSIS — M25.50 PAIN IN UNSPECIFIED JOINT: ICD-10-CM

## 2025-07-29 DIAGNOSIS — I73.00 RAYNAUD'S SYNDROME W/OUT GANGRENE: ICD-10-CM

## 2025-07-29 DIAGNOSIS — R20.2 PARESTHESIA OF SKIN: ICD-10-CM

## 2025-07-29 DIAGNOSIS — Z51.81 ENCOUNTER FOR THERAPEUTIC DRUG LVL MONITORING: ICD-10-CM

## 2025-07-29 DIAGNOSIS — T88.7XXA UNSPECIFIED ADVERSE EFFECT OF DRUG OR MEDICAMENT, INITIAL ENCOUNTER: ICD-10-CM

## 2025-07-29 DIAGNOSIS — M13.0 POLYARTHRITIS, UNSPECIFIED: ICD-10-CM

## 2025-07-29 DIAGNOSIS — Z79.60 LONG TERM (CURRENT) USE OF UNSPECIFIED IMMUNOMODULATORS AND IMMUNOSUPPRESSANTS: ICD-10-CM

## 2025-07-29 PROCEDURE — 99215 OFFICE O/P EST HI 40 MIN: CPT

## 2025-07-29 PROCEDURE — G2211 COMPLEX E/M VISIT ADD ON: CPT | Mod: NC

## 2025-07-30 LAB
ALBUMIN SERPL ELPH-MCNC: 3.8 G/DL
ALP BLD-CCNC: 103 U/L
ALT SERPL-CCNC: 20 U/L
ANION GAP SERPL CALC-SCNC: 13 MMOL/L
AST SERPL-CCNC: 18 U/L
BILIRUB SERPL-MCNC: 0.4 MG/DL
BUN SERPL-MCNC: 10 MG/DL
CALCIUM SERPL-MCNC: 9.3 MG/DL
CHLORIDE SERPL-SCNC: 107 MMOL/L
CO2 SERPL-SCNC: 20 MMOL/L
CREAT SERPL-MCNC: 0.69 MG/DL
CRP SERPL-MCNC: 10 MG/L
EGFRCR SERPLBLD CKD-EPI 2021: 95 ML/MIN/1.73M2
ERYTHROCYTE [SEDIMENTATION RATE] IN BLOOD BY WESTERGREN METHOD: 40 MM/HR
HCT VFR BLD CALC: 49.1 %
HGB BLD-MCNC: 15.1 G/DL
MAGNESIUM SERPL-MCNC: 1.9 MG/DL
MCHC RBC-ENTMCNC: 27 PG
MCHC RBC-ENTMCNC: 30.8 G/DL
MCV RBC AUTO: 87.7 FL
PLATELET # BLD AUTO: 366 K/UL
POTASSIUM SERPL-SCNC: 3.9 MMOL/L
PROT SERPL-MCNC: 6.5 G/DL
RBC # BLD: 5.6 M/UL
RBC # FLD: 19 %
SODIUM SERPL-SCNC: 140 MMOL/L
WBC # FLD AUTO: 11.42 K/UL

## 2025-08-05 ENCOUNTER — APPOINTMENT (OUTPATIENT)
Dept: ORTHOPEDIC SURGERY | Facility: CLINIC | Age: 70
End: 2025-08-05

## 2025-08-07 ENCOUNTER — APPOINTMENT (OUTPATIENT)
Dept: ELECTROPHYSIOLOGY | Facility: CLINIC | Age: 70
End: 2025-08-07

## 2025-08-18 ENCOUNTER — APPOINTMENT (OUTPATIENT)
Dept: PULMONOLOGY | Facility: CLINIC | Age: 70
End: 2025-08-18

## 2025-08-19 ENCOUNTER — APPOINTMENT (OUTPATIENT)
Dept: ENDOCRINOLOGY | Facility: CLINIC | Age: 70
End: 2025-08-19
Payer: COMMERCIAL

## 2025-08-19 VITALS
BODY MASS INDEX: 52.11 KG/M2 | OXYGEN SATURATION: 98 % | WEIGHT: 276 LBS | HEART RATE: 114 BPM | HEIGHT: 61 IN | SYSTOLIC BLOOD PRESSURE: 128 MMHG | DIASTOLIC BLOOD PRESSURE: 78 MMHG

## 2025-08-19 DIAGNOSIS — E03.9 HYPOTHYROIDISM, UNSPECIFIED: ICD-10-CM

## 2025-08-19 DIAGNOSIS — E25.9 ADRENOGENITAL DISORDER, UNSPECIFIED: ICD-10-CM

## 2025-08-19 DIAGNOSIS — E78.5 HYPERLIPIDEMIA, UNSPECIFIED: ICD-10-CM

## 2025-08-19 DIAGNOSIS — E11.65 TYPE 2 DIABETES MELLITUS WITH HYPERGLYCEMIA: ICD-10-CM

## 2025-08-19 PROCEDURE — G2211 COMPLEX E/M VISIT ADD ON: CPT | Mod: NC

## 2025-08-19 PROCEDURE — 83036 HEMOGLOBIN GLYCOSYLATED A1C: CPT | Mod: QW

## 2025-08-19 PROCEDURE — 99214 OFFICE O/P EST MOD 30 MIN: CPT

## 2025-08-21 ENCOUNTER — RX RENEWAL (OUTPATIENT)
Age: 70
End: 2025-08-21

## 2025-08-26 ENCOUNTER — APPOINTMENT (OUTPATIENT)
Dept: RHEUMATOLOGY | Facility: CLINIC | Age: 70
End: 2025-08-26

## 2025-08-26 ENCOUNTER — APPOINTMENT (OUTPATIENT)
Dept: RHEUMATOLOGY | Facility: CLINIC | Age: 70
End: 2025-08-26
Payer: COMMERCIAL

## 2025-08-26 VITALS
WEIGHT: 276 LBS | OXYGEN SATURATION: 98 % | HEIGHT: 61 IN | SYSTOLIC BLOOD PRESSURE: 123 MMHG | BODY MASS INDEX: 52.11 KG/M2 | DIASTOLIC BLOOD PRESSURE: 78 MMHG | HEART RATE: 82 BPM

## 2025-08-26 DIAGNOSIS — I73.00 RAYNAUD'S SYNDROME W/OUT GANGRENE: ICD-10-CM

## 2025-08-26 DIAGNOSIS — J84.9 INTERSTITIAL PULMONARY DISEASE, UNSPECIFIED: ICD-10-CM

## 2025-08-26 DIAGNOSIS — M34.9 SYSTEMIC SCLEROSIS, UNSPECIFIED: ICD-10-CM

## 2025-08-26 DIAGNOSIS — Z79.60 LONG TERM (CURRENT) USE OF UNSPECIFIED IMMUNOMODULATORS AND IMMUNOSUPPRESSANTS: ICD-10-CM

## 2025-08-26 DIAGNOSIS — Z51.81 ENCOUNTER FOR THERAPEUTIC DRUG LVL MONITORING: ICD-10-CM

## 2025-08-26 PROCEDURE — 99215 OFFICE O/P EST HI 40 MIN: CPT

## 2025-08-26 PROCEDURE — G2211 COMPLEX E/M VISIT ADD ON: CPT | Mod: NC

## 2025-08-29 LAB
ALBUMIN SERPL ELPH-MCNC: 4.2 G/DL
ALBUMIN, RANDOM URINE: 2.6 MG/DL
ALP BLD-CCNC: 93 U/L
ALT SERPL-CCNC: 16 U/L
ANION GAP SERPL CALC-SCNC: 16 MMOL/L
AST SERPL-CCNC: 18 U/L
BILIRUB SERPL-MCNC: 0.6 MG/DL
BUN SERPL-MCNC: 10 MG/DL
CALCIUM SERPL-MCNC: 9.5 MG/DL
CHLORIDE SERPL-SCNC: 106 MMOL/L
CHOLEST SERPL-MCNC: 169 MG/DL
CO2 SERPL-SCNC: 20 MMOL/L
CREAT SERPL-MCNC: 0.79 MG/DL
CREAT SPEC-SCNC: 39 MG/DL
CRP SERPL-MCNC: 10 MG/L
EGFRCR SERPLBLD CKD-EPI 2021: 82 ML/MIN/1.73M2
ERYTHROCYTE [SEDIMENTATION RATE] IN BLOOD BY WESTERGREN METHOD: 21 MM/HR
FOLATE SERPL-MCNC: >20 NG/ML
HCT VFR BLD CALC: 50.3 %
HDLC SERPL-MCNC: 41 MG/DL
HGB BLD-MCNC: 15 G/DL
LDLC SERPL-MCNC: 106 MG/DL
MCHC RBC-ENTMCNC: 27.6 PG
MCHC RBC-ENTMCNC: 29.8 G/DL
MCV RBC AUTO: 92.6 FL
MICROALBUMIN/CREAT 24H UR-RTO: 66 MG/G
NONHDLC SERPL-MCNC: 128 MG/DL
PLATELET # BLD AUTO: 358 K/UL
POTASSIUM SERPL-SCNC: 4 MMOL/L
PROT SERPL-MCNC: 6.8 G/DL
RBC # BLD: 5.43 M/UL
RBC # FLD: 18.7 %
SODIUM SERPL-SCNC: 141 MMOL/L
T4 FREE SERPL-MCNC: 1.6 NG/DL
TRIGL SERPL-MCNC: 124 MG/DL
TSH SERPL-ACNC: 0.42 UIU/ML
VIT B12 SERPL-MCNC: 560 PG/ML
WBC # FLD AUTO: 10.64 K/UL

## 2025-09-03 RX ORDER — PREDNISONE 1 MG/1
1 TABLET ORAL
Qty: 360 | Refills: 2 | Status: ACTIVE | COMMUNITY
Start: 2025-09-03 | End: 1900-01-01

## 2025-09-09 ENCOUNTER — APPOINTMENT (OUTPATIENT)
Age: 70
End: 2025-09-09

## 2025-09-12 ENCOUNTER — APPOINTMENT (OUTPATIENT)
Dept: CT IMAGING | Facility: CLINIC | Age: 70
End: 2025-09-12

## 2025-09-12 PROCEDURE — 71250 CT THORAX DX C-: CPT

## 2025-09-19 ENCOUNTER — APPOINTMENT (OUTPATIENT)
Dept: GASTROENTEROLOGY | Facility: CLINIC | Age: 70
End: 2025-09-19